# Patient Record
Sex: MALE | Race: WHITE | NOT HISPANIC OR LATINO | RURAL
[De-identification: names, ages, dates, MRNs, and addresses within clinical notes are randomized per-mention and may not be internally consistent; named-entity substitution may affect disease eponyms.]

---

## 2017-08-09 ENCOUNTER — ON CAMPUS - OUTPATIENT (OUTPATIENT)
Dept: RURAL HOSPITAL 3 | Facility: HOSPITAL | Age: 67
End: 2017-08-09
Payer: COMMERCIAL

## 2017-08-09 DIAGNOSIS — R13.10 DYSPHAGIA, UNSPECIFIED: ICD-10-CM

## 2017-08-09 DIAGNOSIS — K62.5 HEMORRHAGE OF ANUS AND RECTUM: ICD-10-CM

## 2017-08-09 PROCEDURE — 45380 COLONOSCOPY AND BIOPSY: CPT | Performed by: INTERNAL MEDICINE

## 2017-08-09 PROCEDURE — 43450 DILATE ESOPHAGUS 1/MULT PASS: CPT | Performed by: INTERNAL MEDICINE

## 2017-08-09 PROCEDURE — 43235 EGD DIAGNOSTIC BRUSH WASH: CPT | Performed by: INTERNAL MEDICINE

## 2018-09-06 ENCOUNTER — HOSPITAL ENCOUNTER (OUTPATIENT)
Dept: CARDIOLOGY | Facility: HOSPITAL | Age: 68
Discharge: HOME | End: 2018-09-06
Attending: PHYSICAL MEDICINE & REHABILITATION | Admitting: PHYSICAL MEDICINE & REHABILITATION

## 2020-05-20 ENCOUNTER — OFFICE (OUTPATIENT)
Dept: RURAL CLINIC 3 | Facility: CLINIC | Age: 70
End: 2020-05-20
Payer: MEDICARE

## 2020-05-20 VITALS
WEIGHT: 195 LBS | SYSTOLIC BLOOD PRESSURE: 139 MMHG | HEIGHT: 69 IN | DIASTOLIC BLOOD PRESSURE: 85 MMHG | HEART RATE: 96 BPM

## 2020-05-20 DIAGNOSIS — D50.0 IRON DEFICIENCY ANEMIA SECONDARY TO BLOOD LOSS (CHRONIC): ICD-10-CM

## 2020-05-20 PROCEDURE — 99203 OFFICE O/P NEW LOW 30 MIN: CPT | Performed by: INTERNAL MEDICINE

## 2020-05-20 RX ORDER — PANTOPRAZOLE SODIUM 40 MG/1
80 TABLET, DELAYED RELEASE ORAL
Qty: 0 | Refills: 0 | Status: COMPLETED
End: 2020-05-20

## 2020-05-20 RX ORDER — ESOMEPRAZOLE MAGNESIUM 40 MG/1
CAPSULE, DELAYED RELEASE ORAL
Qty: 0 | Refills: 0 | Status: ACTIVE

## 2020-08-05 ENCOUNTER — OFFICE (OUTPATIENT)
Dept: RURAL CLINIC 3 | Facility: CLINIC | Age: 70
End: 2020-08-05
Payer: MEDICARE

## 2020-08-05 VITALS
DIASTOLIC BLOOD PRESSURE: 71 MMHG | HEART RATE: 100 BPM | WEIGHT: 199 LBS | SYSTOLIC BLOOD PRESSURE: 113 MMHG | HEIGHT: 69 IN

## 2020-08-05 DIAGNOSIS — D50.0 IRON DEFICIENCY ANEMIA SECONDARY TO BLOOD LOSS (CHRONIC): ICD-10-CM

## 2020-08-05 DIAGNOSIS — K21.9 GASTRO-ESOPHAGEAL REFLUX DISEASE WITHOUT ESOPHAGITIS: ICD-10-CM

## 2020-08-05 PROCEDURE — 99213 OFFICE O/P EST LOW 20 MIN: CPT | Performed by: INTERNAL MEDICINE

## 2023-11-15 ENCOUNTER — OFFICE (OUTPATIENT)
Dept: RURAL CLINIC 3 | Facility: CLINIC | Age: 73
End: 2023-11-15

## 2023-11-15 VITALS
SYSTOLIC BLOOD PRESSURE: 124 MMHG | WEIGHT: 190 LBS | DIASTOLIC BLOOD PRESSURE: 57 MMHG | HEART RATE: 105 BPM | HEIGHT: 69 IN

## 2023-11-15 DIAGNOSIS — K64.4 RESIDUAL HEMORRHOIDAL SKIN TAGS: ICD-10-CM

## 2023-11-15 DIAGNOSIS — K62.5 HEMORRHAGE OF ANUS AND RECTUM: ICD-10-CM

## 2023-11-15 DIAGNOSIS — K59.00 CONSTIPATION, UNSPECIFIED: ICD-10-CM

## 2023-11-15 DIAGNOSIS — D50.0 IRON DEFICIENCY ANEMIA SECONDARY TO BLOOD LOSS (CHRONIC): ICD-10-CM

## 2023-11-15 PROCEDURE — 99204 OFFICE O/P NEW MOD 45 MIN: CPT | Performed by: INTERNAL MEDICINE

## 2023-11-15 RX ORDER — HYDROCORTISONE ACETATE PRAMOXINE HCL 1; 1 G/100G; G/100G
CREAM TOPICAL
Qty: 30 | Refills: 6 | Status: ACTIVE
Start: 2023-11-15

## 2023-11-15 RX ORDER — HYDROCORTISONE ACETATE 25 MG/1
25 SUPPOSITORY RECTAL
Qty: 10 | Refills: 6 | Status: COMPLETED
Start: 2023-11-15 | End: 2024-03-06

## 2023-11-15 RX ORDER — DOCUSATE SODIUM 100 MG/1
CAPSULE, LIQUID FILLED ORAL
Qty: 60 | Status: COMPLETED
End: 2023-11-15

## 2024-03-06 ENCOUNTER — OFFICE (OUTPATIENT)
Dept: RURAL CLINIC 3 | Facility: CLINIC | Age: 74
End: 2024-03-06

## 2024-03-06 VITALS — HEIGHT: 69 IN | DIASTOLIC BLOOD PRESSURE: 62 MMHG | WEIGHT: 189 LBS | SYSTOLIC BLOOD PRESSURE: 108 MMHG

## 2024-03-06 DIAGNOSIS — K62.5 HEMORRHAGE OF ANUS AND RECTUM: ICD-10-CM

## 2024-03-06 DIAGNOSIS — D50.0 IRON DEFICIENCY ANEMIA SECONDARY TO BLOOD LOSS (CHRONIC): ICD-10-CM

## 2024-03-06 PROCEDURE — 99213 OFFICE O/P EST LOW 20 MIN: CPT | Performed by: INTERNAL MEDICINE

## 2024-03-29 ENCOUNTER — HOSPITAL ENCOUNTER (INPATIENT)
Facility: HOSPITAL | Age: 74
LOS: 5 days | Discharge: NURSING, REHAB OR LONG-TERM CARE | End: 2024-04-03
Attending: FAMILY MEDICINE | Admitting: FAMILY MEDICINE
Payer: MEDICARE

## 2024-03-29 PROBLEM — J96.90 RESPIRATORY FAILURE: Status: ACTIVE | Noted: 2024-03-29

## 2024-03-29 LAB
ARTERIAL PATENCY WRIST A: POSITIVE
ATMOSPHERIC PRESS: ABNORMAL MM[HG]
BASE EXCESS BLDA CALC-SCNC: 10.8 MMOL/L (ref 0–3)
BDY SITE: ABNORMAL
CO2 BLDA-SCNC: 40.6 MMOL/L (ref 22–29)
HCO3 BLDA-SCNC: 38.5 MMOL/L (ref 21–28)
HEMODILUTION: NO
INHALED O2 CONCENTRATION: 36 %
MODALITY: ABNORMAL
PCO2 BLDA: 66.4 MM HG (ref 35–48)
PH BLDA: 7.37 PH UNITS (ref 7.35–7.45)
PO2 BLDA: 110.1 MM HG (ref 83–108)
SAO2 % BLDCOA: 97.9 % (ref 94–98)

## 2024-03-29 PROCEDURE — 94761 N-INVAS EAR/PLS OXIMETRY MLT: CPT

## 2024-03-29 PROCEDURE — 25810000003 SODIUM CHLORIDE 0.9 % SOLUTION: Performed by: FAMILY MEDICINE

## 2024-03-29 PROCEDURE — 99222 1ST HOSP IP/OBS MODERATE 55: CPT | Performed by: INTERNAL MEDICINE

## 2024-03-29 PROCEDURE — 94799 UNLISTED PULMONARY SVC/PX: CPT

## 2024-03-29 PROCEDURE — 5A09357 ASSISTANCE WITH RESPIRATORY VENTILATION, LESS THAN 24 CONSECUTIVE HOURS, CONTINUOUS POSITIVE AIRWAY PRESSURE: ICD-10-PCS | Performed by: INTERNAL MEDICINE

## 2024-03-29 PROCEDURE — 93010 ELECTROCARDIOGRAM REPORT: CPT | Performed by: INTERNAL MEDICINE

## 2024-03-29 PROCEDURE — 82803 BLOOD GASES ANY COMBINATION: CPT | Performed by: FAMILY MEDICINE

## 2024-03-29 PROCEDURE — 94660 CPAP INITIATION&MGMT: CPT

## 2024-03-29 PROCEDURE — 93005 ELECTROCARDIOGRAM TRACING: CPT | Performed by: INTERNAL MEDICINE

## 2024-03-29 PROCEDURE — 94640 AIRWAY INHALATION TREATMENT: CPT

## 2024-03-29 PROCEDURE — 25010000002 METHYLPREDNISOLONE PER 125 MG: Performed by: FAMILY MEDICINE

## 2024-03-29 PROCEDURE — 25010000002 CEFTRIAXONE PER 250 MG: Performed by: FAMILY MEDICINE

## 2024-03-29 PROCEDURE — 36600 WITHDRAWAL OF ARTERIAL BLOOD: CPT | Performed by: FAMILY MEDICINE

## 2024-03-29 RX ORDER — SODIUM CHLORIDE 0.9 % (FLUSH) 0.9 %
10 SYRINGE (ML) INJECTION AS NEEDED
Status: DISCONTINUED | OUTPATIENT
Start: 2024-03-29 | End: 2024-04-03 | Stop reason: HOSPADM

## 2024-03-29 RX ORDER — FAMOTIDINE 20 MG/1
20 TABLET, FILM COATED ORAL
Status: DISCONTINUED | OUTPATIENT
Start: 2024-03-29 | End: 2024-03-31

## 2024-03-29 RX ORDER — BUDESONIDE 0.5 MG/2ML
0.5 INHALANT ORAL
Status: DISCONTINUED | OUTPATIENT
Start: 2024-03-29 | End: 2024-03-31

## 2024-03-29 RX ORDER — IPRATROPIUM BROMIDE AND ALBUTEROL SULFATE 2.5; .5 MG/3ML; MG/3ML
3 SOLUTION RESPIRATORY (INHALATION)
Status: DISCONTINUED | OUTPATIENT
Start: 2024-03-29 | End: 2024-03-31

## 2024-03-29 RX ORDER — OMEPRAZOLE 40 MG/1
40 CAPSULE, DELAYED RELEASE ORAL DAILY
COMMUNITY
End: 2024-04-03 | Stop reason: HOSPADM

## 2024-03-29 RX ORDER — TRAZODONE HYDROCHLORIDE 100 MG/1
100 TABLET ORAL 3 TIMES DAILY
COMMUNITY

## 2024-03-29 RX ORDER — SODIUM CHLORIDE 9 MG/ML
40 INJECTION, SOLUTION INTRAVENOUS AS NEEDED
Status: DISCONTINUED | OUTPATIENT
Start: 2024-03-29 | End: 2024-04-03 | Stop reason: HOSPADM

## 2024-03-29 RX ORDER — METHYLPREDNISOLONE SODIUM SUCCINATE 125 MG/2ML
60 INJECTION, POWDER, LYOPHILIZED, FOR SOLUTION INTRAMUSCULAR; INTRAVENOUS EVERY 8 HOURS
Status: DISCONTINUED | OUTPATIENT
Start: 2024-03-29 | End: 2024-03-31

## 2024-03-29 RX ORDER — PREDNISONE 5 MG/1
5 TABLET ORAL DAILY
COMMUNITY
End: 2024-04-03 | Stop reason: HOSPADM

## 2024-03-29 RX ORDER — DILTIAZEM HCL/D5W 125 MG/125
5-15 PLASTIC BAG, INJECTION (ML) INTRAVENOUS
Status: DISCONTINUED | OUTPATIENT
Start: 2024-03-29 | End: 2024-03-30

## 2024-03-29 RX ORDER — FUROSEMIDE 20 MG/1
20 TABLET ORAL DAILY
COMMUNITY
End: 2024-04-03 | Stop reason: HOSPADM

## 2024-03-29 RX ORDER — SODIUM CHLORIDE 9 MG/ML
40 INJECTION, SOLUTION INTRAVENOUS CONTINUOUS
Status: DISCONTINUED | OUTPATIENT
Start: 2024-03-29 | End: 2024-04-02

## 2024-03-29 RX ORDER — ALPRAZOLAM 1 MG/1
1 TABLET ORAL 3 TIMES DAILY PRN
COMMUNITY

## 2024-03-29 RX ORDER — SODIUM CHLORIDE 0.9 % (FLUSH) 0.9 %
10 SYRINGE (ML) INJECTION EVERY 12 HOURS SCHEDULED
Status: DISCONTINUED | OUTPATIENT
Start: 2024-03-29 | End: 2024-04-03 | Stop reason: HOSPADM

## 2024-03-29 RX ADMIN — BUDESONIDE 0.5 MG: 0.5 INHALANT RESPIRATORY (INHALATION) at 19:00

## 2024-03-29 RX ADMIN — IPRATROPIUM BROMIDE AND ALBUTEROL SULFATE 3 ML: .5; 3 SOLUTION RESPIRATORY (INHALATION) at 18:55

## 2024-03-29 RX ADMIN — Medication 10 ML: at 21:49

## 2024-03-29 RX ADMIN — SODIUM CHLORIDE 40 ML/HR: 9 INJECTION, SOLUTION INTRAVENOUS at 17:12

## 2024-03-29 RX ADMIN — FAMOTIDINE 20 MG: 20 TABLET, FILM COATED ORAL at 17:40

## 2024-03-29 RX ADMIN — CEFTRIAXONE 2000 MG: 2 INJECTION, POWDER, FOR SOLUTION INTRAMUSCULAR; INTRAVENOUS at 17:15

## 2024-03-29 RX ADMIN — METHYLPREDNISOLONE SODIUM SUCCINATE 60 MG: 125 INJECTION, POWDER, FOR SOLUTION INTRAMUSCULAR; INTRAVENOUS at 23:34

## 2024-03-29 RX ADMIN — METHYLPREDNISOLONE SODIUM SUCCINATE 60 MG: 125 INJECTION, POWDER, FOR SOLUTION INTRAMUSCULAR; INTRAVENOUS at 17:12

## 2024-03-29 NOTE — H&P
[facility] Medicine Services  History & Physical    Patient Name: Orlando Velasquez  : 1950  MRN: [id number]  Primary Care Physician:  Orlando Payton MD  Date of admission: 3/29/2024  Date and Time of Service: 3/29/2024 at 1553    Subjective      Chief Complaint: shortness of air    History of Present Illness: Orlando Velasquez is a 73 y.o. male with a CMH of hypertension, hyperlipidemia, COPD, chronic respiratory failure presenting from outside facility with difficulty breathing.  He was being treated for pneumonia and COPD exacerbation and acute hypoxic hypercapnic respiratory failure.  He was kept on BiPAP intermittently.  Patient had CT scan done at outside facility which showed possible mucous plugging and he was transferred here for possible pulmonology consult and bronchoscopy.  As of right now patient is on 4 L of supplemental oxygen, at baseline he wears 3 L at home.  Patient also fell down a few days ago and has bruising on his face patient was also diagnosed with rhabdomyolysis and was being treated with IV fluids at outside facility.      Review of Systems   Respiratory:  Positive for cough and shortness of breath.    Cardiovascular:  Negative for chest pain.       Personal History     Past medical history-hypertension, hyperlipidemia, COPD, respiratory failure    No surgeries    Family History: Father had hypertension    Social History:  Previous smoker    Home Medications:  Prior to Admission Medications       None          No current facility-administered medications on file prior to encounter.     No current outpatient medications on file prior to encounter.         Allergies:  No Known Allergies    Objective      Vitals:   Temp:  [98 °F (36.7 °C)] 98 °F (36.7 °C)  Resp:  [22] 22  BP: (146)/(75) 146/75  There is no height or weight on file to calculate BMI.  Physical Exam  Vitals and nursing note reviewed.   Constitutional:       General: He is not in acute distress.      Appearance: He is well-developed. He is not diaphoretic.   HENT:      Head: Normocephalic and atraumatic.   Cardiovascular:      Rate and Rhythm: Normal rate.   Pulmonary:      Effort: Pulmonary effort is normal. Respiratory distress present.      Breath sounds: No wheezing.   Abdominal:      General: There is no distension.      Palpations: Abdomen is soft.   Musculoskeletal:         General: Normal range of motion.   Skin:     General: Skin is warm and dry.   Neurological:      Mental Status: He is alert.      Cranial Nerves: No cranial nerve deficit.   Psychiatric:         Behavior: Behavior normal.         Thought Content: Thought content normal.         Judgment: Judgment normal.         Diagnostic Data:  Lab Results (last 24 hours)       ** No results found for the last 24 hours. **             Imaging Results (Last 24 Hours)       ** No results found for the last 24 hours. **              Assessment & Plan            Active and Resolved Problems  Active Hospital Problems    Diagnosis  POA    **Respiratory failure [J96.90]  Yes      Resolved Hospital Problems   No resolved problems to display.         Acute on chronic hypoxic hypercapnic respiratory failure-ABG will be ordered, will start on BiPAP as needed, pulmonology consult    Pneumonia-IV antibiotics have been started, cultures will be ordered, continue to monitor    Atrial fibrillation-cardiology consult, we will continue to monitor heart rate, Cardizem will be given as needed    Rhabdomyolysis-continue with IV fluids continue monitoring    Hypertension-continue monitoring blood pressure    DVT prophylaxis-SCD        CODE STATUS:    Code Status (Patient has no pulse and is not breathing): CPR (Attempt to Resuscitate)  Medical Interventions (Patient has pulse or is breathing): Full Support      InpatientAdmission Status:  I believe this patient meets inpatient status.    Expected Length of Stay: 2-3    PDMP and Medication Dispenses via Sidebar reviewed  and consistent with patient reported medications.    I discussed the patient's findings and my recommendations with patient and family.      Signature:     This document has been electronically signed by Tommie Palm MD on March 29, 2024 15:53 EDT   [facility]

## 2024-03-29 NOTE — CASE MANAGEMENT/SOCIAL WORK
Discharge Planning Assessment   Zaid     Patient Name: Orlando Velasquez  MRN: [id number]  Today's Date: 3/29/2024    Admit Date: 3/29/2024    Plan: [facility] referral pending PT recommendations. No precert required. PASRR required. From home with spouse. Current home O2 (3L) through [place].   Discharge Needs Assessment       Row Name 03/29/24 1611       Living Environment    People in Home spouse    Current Living Arrangements home    Potentially Unsafe Housing Conditions none    In the past 12 months has the electric, gas, oil, or water company threatened to shut off services in your home? No    Primary Care Provided by self    Provides Primary Care For no one    Family Caregiver if Needed child(migdalia), adult;grandchild(migdalia), adult;spouse    Quality of Family Relationships helpful    Able to Return to Prior Arrangements yes       Resource/Environmental Concerns    Resource/Environmental Concerns none    Transportation Concerns none       Transportation Needs    In the past 12 months, has lack of transportation kept you from medical appointments or from getting medications? no    In the past 12 months, has lack of transportation kept you from meetings, work, or from getting things needed for daily living? No       Food Insecurity    Within the past 12 months, you worried that your food would run out before you got the money to buy more. Never true    Within the past 12 months, the food you bought just didn't last and you didn't have money to get more. Never true       Transition Planning    Patient/Family Anticipates Transition to home with help/services;other (see comments)  SNF    Patient/Family Anticipated Services at Transition none    Transportation Anticipated family or friend will provide       Discharge Needs Assessment    Readmission Within the Last 30 Days no previous admission in last 30 days    Equipment Currently Used at Home oxygen    Concerns to be Addressed denies needs/concerns at this time     Anticipated Changes Related to Illness none    Equipment Needed After Discharge none                   Discharge Plan       Row Name 03/29/24 1612       Plan    Plan [facility] referral pending PT recommendations. No precert required. PASRR required. From home with spouse. Current home O2 (3L) through [place].    Patient/Family in Agreement with Plan yes    Plan Comments CM met with patient and spouse at bedside. Patient lives with spouse who assists with ADLs and transportation. PCP and pharmacy verified-reports high med costs but able to afford all current meds. [facility] following for [facility] per spouse, CM notified liaison. [facility] letter reviewed with spouse, consent obtained and declined copy. Patient denies any further d/c needs at this time. Barrier to D/C: 4L O2, cardiology and pulmonology consults, bronch pending.                      Expected Discharge Date and Time       Expected Discharge Date Expected Discharge Time    Apr 1, 2024            Demographic Summary       Row Name 03/29/24 1611       General Information    Admission Type inpatient    Arrived From emergency department    Referral Source admission list    Reason for Consult discharge planning    Preferred Language English       Contact Information    Permission Granted to Share Info With                    Functional Status       Row Name 03/29/24 1611       Functional Status    Usual Activity Tolerance moderate    Current Activity Tolerance moderate       Functional Status, IADL    Medications assistive person    Meal Preparation assistive person    Housekeeping assistive person    Laundry assistive person    Shopping assistive person       Mental Status    General Appearance WDL WDL       Mental Status Summary    Recent Changes in Mental Status/Cognitive Functioning no changes                       Patient Forms       Row Name 03/29/24 1615       Patient Forms    Important Message from Medicare ([facility]) Delivered  3/29/24     Delivered to Support person  spouse    Method of delivery In person                  Met with patient in room.  Maintained distance greater than six feet and spent less than 15 minutes in the room.     ALTON BlackmanN, RN    [address] [address]    Office: [phone]  Fax: [phone]

## 2024-03-29 NOTE — PLAN OF CARE
Goal Outcome Evaluation:        Problem: Skin Injury Risk Increased  Goal: Skin Health and Integrity  Outcome: Ongoing, Progressing  Intervention: Optimize Skin Protection  Recent Flowsheet Documentation  Taken 3/29/2024 1627 by Cookie Cr RN  Pressure Reduction Techniques: frequent weight shift encouraged  Head of Bed (HOB) Positioning: HOB at 30-45 degrees  Pressure Reduction Devices: positioning supports utilized  Skin Protection: adhesive use limited  Taken 3/29/2024 1411 by Cookie Cr RN  Head of Bed (HOB) Positioning: HOB at 30-45 degrees     Problem: Adult Inpatient Plan of Care  Goal: Plan of Care Review  Outcome: Ongoing, Progressing  Goal: Patient-Specific Goal (Individualized)  Outcome: Ongoing, Progressing  Goal: Absence of Hospital-Acquired Illness or Injury  Outcome: Ongoing, Progressing  Intervention: Identify and Manage Fall Risk  Recent Flowsheet Documentation  Taken 3/29/2024 1627 by Cookie Cr RN  Safety Promotion/Fall Prevention:   activity supervised   assistive device/personal items within reach   clutter free environment maintained   fall prevention program maintained   nonskid shoes/slippers when out of bed   safety round/check completed   room organization consistent  Taken 3/29/2024 1411 by Cookie Cr RN  Safety Promotion/Fall Prevention:   activity supervised   assistive device/personal items within reach   clutter free environment maintained   fall prevention program maintained   muscle strengthening facilitated   nonskid shoes/slippers when out of bed   room organization consistent   safety round/check completed  Intervention: Prevent Skin Injury  Recent Flowsheet Documentation  Taken 3/29/2024 1627 by Cookie Cr RN  Body Position: position changed independently  Skin Protection: adhesive use limited  Taken 3/29/2024 1411 by Cookie Cr RN  Body Position: position changed independently  Intervention: Prevent and Manage VTE (Venous Thromboembolism)  Risk  Recent Flowsheet Documentation  Taken 3/29/2024 1627 by Cookie Cr RN  Activity Management: activity encouraged  VTE Prevention/Management:   patient refused intervention   bilateral   sequential compression devices off  Range of Motion: active ROM (range of motion) encouraged  Taken 3/29/2024 1411 by Cookie Cr RN  Activity Management: activity encouraged  VTE Prevention/Management:   patient refused intervention   bilateral   sequential compression devices off  Range of Motion: active ROM (range of motion) encouraged  Intervention: Prevent Infection  Recent Flowsheet Documentation  Taken 3/29/2024 1627 by Cookie Cr RN  Infection Prevention:   hand hygiene promoted   personal protective equipment utilized   rest/sleep promoted   single patient room provided   environmental surveillance performed  Taken 3/29/2024 1411 by Cookie Cr RN  Infection Prevention:   hand hygiene promoted   personal protective equipment utilized   rest/sleep promoted   single patient room provided   environmental surveillance performed  Goal: Optimal Comfort and Wellbeing  Outcome: Ongoing, Progressing  Intervention: Provide Person-Centered Care  Recent Flowsheet Documentation  Taken 3/29/2024 1627 by Cookie Cr RN  Trust Relationship/Rapport:   care explained   choices provided  Taken 3/29/2024 1411 by Cookie Cr RN  Trust Relationship/Rapport:   care explained   choices provided  Goal: Readiness for Transition of Care  Outcome: Ongoing, Progressing

## 2024-03-29 NOTE — CONSULTS
Referring Provider: Tommie Palm MD    Reason for Consultation: Atrial fibrillation      Patient Care Team:  Jagdeep Dailey DO as PCP - General (Internal Medicine)      SUBJECTIVE     Chief Complaint: Shortness of breath    History of present illness:  Orlando Velasquez is a 73 y.o. male with hypertension, hyperlipidemia,, pulmonary embolism in 2018, severe COPD who presented to the hospital due to shortness of breath and difficulty breathing.  Apparently he was being treated for pneumonia and COPD exacerbation and was on BiPAP intermittently.  A CT scan showed possible mucous plugging for which she was transferred to [facility] for possible bronchoscopy.  He is currently on 4 L of supplemental oxygen.  Cardiology has been consulted for management of atrial fibrillation.    Review of systems:    Constitutional: No weakness, fatigue, fever, rigors, chills   Eyes: No vision changes, eye pain   ENT/oropharynx: No difficulty swallowing, sore throat, epistaxis, changes in hearing   Cardiovascular: No chest pain, chest tightness, palpitations, paroxysmal nocturnal dyspnea, orthopnea, diaphoresis, dizziness / syncopal episode   Respiratory: + shortness of breath, dyspnea on exertion, cough, wheezing, hemoptysis   Gastrointestinal: No abdominal pain, nausea, vomiting, diarrhea, bloody stools   Genitourinary: No hematuria, dysuria   Neurological: No headache, tremors, numbness, one-sided weakness    Musculoskeletal: No cramps, myalgias, joint pain, joint swelling   Integument: No rash, edema        Personal History:      Past Medical History:   Diagnosis Date    A-fib     Arthritis     Asthma     Cancer     COPD (chronic obstructive pulmonary disease)     DVT (deep venous thrombosis)     GERD (gastroesophageal reflux disease)     Hernia, abdominal     History of transfusion     PE (pulmonary thromboembolism)     Presence of IVC filter        History reviewed. No pertinent surgical history.    History reviewed.  "No pertinent family history.    Social History     Tobacco Use    Smoking status: Former     Current packs/day: 0.00     Types: Cigarettes     Quit date:      Years since quittin.2     Passive exposure: Past    Smokeless tobacco: Never   Vaping Use    Vaping status: Never Used   Substance Use Topics    Alcohol use: Never    Drug use: Never        Home meds:  Prior to Admission medications    Not on File       Allergies:     Patient has no known allergies.    Scheduled Meds:budesonide, 0.5 mg, Nebulization, BID - RT  cefTRIAXone, 1,000 mg, Intravenous, Q24H  famotidine, 20 mg, Oral, BID AC  ipratropium-albuterol, 3 mL, Nebulization, 4x Daily - RT  methylPREDNISolone sodium succinate, 60 mg, Intravenous, Q8H  sodium chloride, 10 mL, Intravenous, Q12H      Continuous Infusions:dilTIAZem, 5-15 mg/hr  sodium chloride, 40 mL/hr      PRN Meds:  sodium chloride    sodium chloride      OBJECTIVE    Vital Signs  Vitals:    24 1411 24 1500 24 1640 24 1704   BP: 146/75   144/77   BP Location: Left arm   Left arm   Patient Position: Lying   Lying   Pulse:   78 84   Resp: 22   25   Temp: 98 °F (36.7 °C)   98 °F (36.7 °C)   TempSrc: Oral   Oral   SpO2:   91% 92%   Weight:  92.3 kg (203 lb 7.8 oz)     Height:   162.6 cm (64\")            No intake or output data in the 24 hours ending 24 1706     Telemetry: Atrial fibrillation converted to normal sinus rhythm.    Physical Exam:  The patient is alert, oriented and in no distress.  Frail-appearing  Vital signs as noted above.  Head and neck revealed no carotid bruits or jugular venous distention.  No thyromegaly or lymphadenopathy is present  Distant and diminished breath sounds.  Heart: Normal first and second heart sounds. No murmur.  No precordial rub is present.  No gallop is present.  Abdomen: Soft and nontender.  No organomegaly is present.  Extremities with good peripheral pulses without any pedal edema.  Skin: Warm and " dry.  Musculoskeletal system is grossly normal.  CNS grossly normal.       Results Review:  I have personally reviewed the results from the time of this admission to 3/29/2024 17:06 EDT and agree with these findings:  []  Laboratory  []  Microbiology  []  Radiology  []  EKG/Telemetry   []  Cardiology/Vascular   []  Pathology  []  Old records  []  Other:    Most notable findings include:     Lab Results (last 24 hours)       Procedure Component Value Units Date/Time    Blood Gas, Arterial - [[id number]]  (Abnormal) Collected: 03/29/24 1606    Specimen: Arterial Blood Updated: 03/29/24 1612     Site Right Radial     Oscar's Test Positive     pH, Arterial 7.371 pH units      pCO2, Arterial 66.4 mm Hg      pO2, Arterial 110.1 mm Hg      HCO3, Arterial 38.5 mmol/L      Base Excess, Arterial 10.8 mmol/L      Comment: Serial Number: [id number]:  [id number]        O2 Saturation, Arterial 97.9 %      CO2 Content 40.6 mmol/L      Barometric Pressure for Blood Gas --     Comment: N/A        Modality Cannula     FIO2 36 %      Hemodilution No            Imaging Results (Last 24 Hours)       ** No results found for the last 24 hours. **            LAB RESULTS (LAST 7 DAYS)    CBC        BMP        CMP         BNP        TROPONIN        CoAg        Creatinine Clearance  CrCl cannot be calculated (Patient's most recent lab result is older than the maximum 30 days allowed.).    ABG  Results from last 7 days   Lab Units 03/29/24  1606   PH, ARTERIAL pH units 7.371   PCO2, ARTERIAL mm Hg 66.4*   PO2 ART mm Hg 110.1*   O2 SATURATION ART % 97.9   BASE EXCESS ART mmol/L 10.8*         Radiology  No radiology results for the last day      EKG  I personally viewed and interpreted the patient's EKG/Telemetry data:  ECG 12 Lead Rhythm Change    (Results Pending)         Echocardiogram:          Stress Test:        Cardiac Catheterization:  No results found for this or any previous visit.        Other:      ASSESSMENT & PLAN:    Principal  Problem:    Respiratory failure    Atrial fibrillation  DAB2RT4-TYPh score is 4  Hold anticoagulation for possible bronchoscopy  Unable to use Cardizem as blood pressure is low.  Can use IV amiodarone if needed for RVR  He has converted to sinus rhythm in the 70s  Plan to resume anticoagulation after all procedures have been performed.  Avoiding beta-blocker due to severe COPD.    Rhabdomyolysis  Currently on IV fluids  Pending labs    Hyperlipidemia  Start high intensity statin  Obtain a lipid panel    COPD/pneumonia  Acute hypoxemic hypercapnic respiratory failure.  Currently on antibiotics, bronchodilators and steroids  Supplemental oxygen as needed    History of pulmonary embolism in 2018  History of IVC filter which was removed in 2019    Obesity  BMI of 35  Obesity complicates all aspects of his care  Lifestyle modifications recommended to the patient    Clinton Fisher MD  03/29/24  17:06 EDT

## 2024-03-29 NOTE — CASE MANAGEMENT/SOCIAL WORK
Social Work Assessment   Zaid     Patient Name: Orlando Velasquez  MRN: [id number]  Today's Date: 3/29/2024    Admit Date: 3/29/2024     Discharge Plan       Row Name 03/29/24 1620       Plan    Plan [facility] referral pending PT recommendations. No precert required. PASRR approved. From home with spouse. Current home O2 (3L) through [place].        CY Chun, LSW, [facility]    Phone: [phone]  Cell: [phone]  Fax: [phone]  [email or web address]

## 2024-03-30 LAB
ANION GAP SERPL CALCULATED.3IONS-SCNC: 6 MMOL/L (ref 5–15)
ANION GAP SERPL CALCULATED.3IONS-SCNC: 9 MMOL/L (ref 5–15)
ANISOCYTOSIS BLD QL: ABNORMAL
BUN SERPL-MCNC: 19 MG/DL (ref 8–23)
BUN SERPL-MCNC: 23 MG/DL (ref 8–23)
BUN/CREAT SERPL: 25.3 (ref 7–25)
BUN/CREAT SERPL: 27.1 (ref 7–25)
CALCIUM SPEC-SCNC: 8.2 MG/DL (ref 8.6–10.5)
CALCIUM SPEC-SCNC: 8.9 MG/DL (ref 8.6–10.5)
CHLORIDE SERPL-SCNC: 102 MMOL/L (ref 98–107)
CHLORIDE SERPL-SCNC: 103 MMOL/L (ref 98–107)
CHOLEST SERPL-MCNC: 135 MG/DL (ref 0–200)
CO2 SERPL-SCNC: 32 MMOL/L (ref 22–29)
CO2 SERPL-SCNC: 33 MMOL/L (ref 22–29)
CREAT SERPL-MCNC: 0.75 MG/DL (ref 0.76–1.27)
CREAT SERPL-MCNC: 0.85 MG/DL (ref 0.76–1.27)
D-LACTATE SERPL-SCNC: 1.5 MMOL/L (ref 0.5–2)
DEPRECATED RDW RBC AUTO: 49.8 FL (ref 37–54)
EGFRCR SERPLBLD CKD-EPI 2021: 92.3 ML/MIN/1.73
EGFRCR SERPLBLD CKD-EPI 2021: 95.9 ML/MIN/1.73
EOSINOPHIL # BLD MANUAL: 0.09 10*3/MM3 (ref 0–0.4)
EOSINOPHIL NFR BLD MANUAL: 1 % (ref 0.3–6.2)
ERYTHROCYTE [DISTWIDTH] IN BLOOD BY AUTOMATED COUNT: 14.8 % (ref 12.3–15.4)
GLUCOSE BLDC GLUCOMTR-MCNC: 112 MG/DL (ref 70–105)
GLUCOSE SERPL-MCNC: 116 MG/DL (ref 65–99)
GLUCOSE SERPL-MCNC: 137 MG/DL (ref 65–99)
HCT VFR BLD AUTO: 36 % (ref 37.5–51)
HDLC SERPL-MCNC: 34 MG/DL (ref 40–60)
HGB BLD-MCNC: 10.5 G/DL (ref 13–17.7)
LDLC SERPL CALC-MCNC: 80 MG/DL (ref 0–100)
LDLC/HDLC SERPL: 2.31 {RATIO}
LYMPHOCYTES # BLD MANUAL: 0.6 10*3/MM3 (ref 0.7–3.1)
MAGNESIUM SERPL-MCNC: 2 MG/DL (ref 1.6–2.4)
MAGNESIUM SERPL-MCNC: 2.1 MG/DL (ref 1.6–2.4)
MCH RBC QN AUTO: 26.5 PG (ref 26.6–33)
MCHC RBC AUTO-ENTMCNC: 29.2 G/DL (ref 31.5–35.7)
MCV RBC AUTO: 90.9 FL (ref 79–97)
NEUTROPHILS # BLD AUTO: 7.83 10*3/MM3 (ref 1.7–7)
NEUTROPHILS NFR BLD MANUAL: 92 % (ref 42.7–76)
NRBC SPEC MANUAL: 1 /100 WBC (ref 0–0.2)
PLATELET # BLD AUTO: 241 10*3/MM3 (ref 140–450)
PMV BLD AUTO: 9.5 FL (ref 6–12)
POTASSIUM SERPL-SCNC: 4.2 MMOL/L (ref 3.5–5.2)
POTASSIUM SERPL-SCNC: 4.5 MMOL/L (ref 3.5–5.2)
QT INTERVAL: 353 MS
QTC INTERVAL: 386 MS
RBC # BLD AUTO: 3.96 10*6/MM3 (ref 4.14–5.8)
SCAN SLIDE: NORMAL
SMALL PLATELETS BLD QL SMEAR: ADEQUATE
SODIUM SERPL-SCNC: 141 MMOL/L (ref 136–145)
SODIUM SERPL-SCNC: 144 MMOL/L (ref 136–145)
TRIGL SERPL-MCNC: 113 MG/DL (ref 0–150)
VARIANT LYMPHS NFR BLD MANUAL: 7 % (ref 19.6–45.3)
VLDLC SERPL-MCNC: 21 MG/DL (ref 5–40)
WBC MORPH BLD: NORMAL
WBC NRBC COR # BLD AUTO: 8.51 10*3/MM3 (ref 3.4–10.8)

## 2024-03-30 PROCEDURE — 97162 PT EVAL MOD COMPLEX 30 MIN: CPT

## 2024-03-30 PROCEDURE — 25010000002 HALOPERIDOL LACTATE PER 5 MG: Performed by: STUDENT IN AN ORGANIZED HEALTH CARE EDUCATION/TRAINING PROGRAM

## 2024-03-30 PROCEDURE — 94799 UNLISTED PULMONARY SVC/PX: CPT

## 2024-03-30 PROCEDURE — 25010000002 METHYLPREDNISOLONE PER 125 MG: Performed by: FAMILY MEDICINE

## 2024-03-30 PROCEDURE — 92610 EVALUATE SWALLOWING FUNCTION: CPT

## 2024-03-30 PROCEDURE — 94664 DEMO&/EVAL PT USE INHALER: CPT

## 2024-03-30 PROCEDURE — 25010000002 CEFTRIAXONE PER 250 MG: Performed by: FAMILY MEDICINE

## 2024-03-30 PROCEDURE — 83605 ASSAY OF LACTIC ACID: CPT | Performed by: FAMILY MEDICINE

## 2024-03-30 PROCEDURE — 82948 REAGENT STRIP/BLOOD GLUCOSE: CPT

## 2024-03-30 PROCEDURE — 87040 BLOOD CULTURE FOR BACTERIA: CPT | Performed by: INTERNAL MEDICINE

## 2024-03-30 PROCEDURE — 85025 COMPLETE CBC W/AUTO DIFF WBC: CPT | Performed by: FAMILY MEDICINE

## 2024-03-30 PROCEDURE — 80061 LIPID PANEL: CPT | Performed by: INTERNAL MEDICINE

## 2024-03-30 PROCEDURE — 83735 ASSAY OF MAGNESIUM: CPT | Performed by: FAMILY MEDICINE

## 2024-03-30 PROCEDURE — 94761 N-INVAS EAR/PLS OXIMETRY MLT: CPT

## 2024-03-30 PROCEDURE — 99232 SBSQ HOSP IP/OBS MODERATE 35: CPT | Performed by: INTERNAL MEDICINE

## 2024-03-30 PROCEDURE — 85007 BL SMEAR W/DIFF WBC COUNT: CPT | Performed by: FAMILY MEDICINE

## 2024-03-30 PROCEDURE — 80048 BASIC METABOLIC PNL TOTAL CA: CPT | Performed by: FAMILY MEDICINE

## 2024-03-30 PROCEDURE — 94660 CPAP INITIATION&MGMT: CPT

## 2024-03-30 RX ORDER — HALOPERIDOL 5 MG/ML
2 INJECTION INTRAMUSCULAR ONCE
Status: COMPLETED | OUTPATIENT
Start: 2024-03-30 | End: 2024-03-30

## 2024-03-30 RX ORDER — ALPRAZOLAM 1 MG/1
1 TABLET ORAL 3 TIMES DAILY PRN
Status: DISCONTINUED | OUTPATIENT
Start: 2024-03-30 | End: 2024-04-03 | Stop reason: HOSPADM

## 2024-03-30 RX ORDER — IPRATROPIUM BROMIDE AND ALBUTEROL SULFATE 2.5; .5 MG/3ML; MG/3ML
3 SOLUTION RESPIRATORY (INHALATION) EVERY 4 HOURS PRN
COMMUNITY

## 2024-03-30 RX ORDER — ALBUTEROL SULFATE 90 UG/1
2 AEROSOL, METERED RESPIRATORY (INHALATION) EVERY 4 HOURS PRN
COMMUNITY

## 2024-03-30 RX ORDER — ACETYLCYSTEINE 200 MG/ML
3 SOLUTION ORAL; RESPIRATORY (INHALATION)
Status: DISCONTINUED | OUTPATIENT
Start: 2024-03-30 | End: 2024-03-31

## 2024-03-30 RX ORDER — IPRATROPIUM BROMIDE AND ALBUTEROL SULFATE 2.5; .5 MG/3ML; MG/3ML
3 SOLUTION RESPIRATORY (INHALATION) EVERY 4 HOURS PRN
Status: DISCONTINUED | OUTPATIENT
Start: 2024-03-30 | End: 2024-03-31

## 2024-03-30 RX ORDER — BUDESONIDE AND FORMOTEROL FUMARATE DIHYDRATE 80; 4.5 UG/1; UG/1
2 AEROSOL RESPIRATORY (INHALATION)
COMMUNITY
End: 2024-04-03 | Stop reason: HOSPADM

## 2024-03-30 RX ADMIN — Medication 10 ML: at 08:18

## 2024-03-30 RX ADMIN — HALOPERIDOL LACTATE 2 MG: 5 INJECTION, SOLUTION INTRAMUSCULAR at 19:39

## 2024-03-30 RX ADMIN — METHYLPREDNISOLONE SODIUM SUCCINATE 60 MG: 125 INJECTION, POWDER, FOR SOLUTION INTRAMUSCULAR; INTRAVENOUS at 16:55

## 2024-03-30 RX ADMIN — METHYLPREDNISOLONE SODIUM SUCCINATE 60 MG: 125 INJECTION, POWDER, FOR SOLUTION INTRAMUSCULAR; INTRAVENOUS at 08:16

## 2024-03-30 RX ADMIN — ALPRAZOLAM 1 MG: 1 TABLET ORAL at 20:27

## 2024-03-30 RX ADMIN — IPRATROPIUM BROMIDE AND ALBUTEROL SULFATE 3 ML: .5; 3 SOLUTION RESPIRATORY (INHALATION) at 10:48

## 2024-03-30 RX ADMIN — IPRATROPIUM BROMIDE AND ALBUTEROL SULFATE 3 ML: .5; 3 SOLUTION RESPIRATORY (INHALATION) at 08:00

## 2024-03-30 RX ADMIN — Medication 10 ML: at 20:43

## 2024-03-30 RX ADMIN — ACETYLCYSTEINE 3 ML: 200 SOLUTION ORAL; RESPIRATORY (INHALATION) at 18:53

## 2024-03-30 RX ADMIN — IPRATROPIUM BROMIDE AND ALBUTEROL SULFATE 3 ML: .5; 3 SOLUTION RESPIRATORY (INHALATION) at 22:23

## 2024-03-30 RX ADMIN — BUDESONIDE 0.5 MG: 0.5 INHALANT RESPIRATORY (INHALATION) at 08:00

## 2024-03-30 RX ADMIN — BUDESONIDE 0.5 MG: 0.5 INHALANT RESPIRATORY (INHALATION) at 19:03

## 2024-03-30 RX ADMIN — IPRATROPIUM BROMIDE AND ALBUTEROL SULFATE 3 ML: .5; 3 SOLUTION RESPIRATORY (INHALATION) at 18:53

## 2024-03-30 RX ADMIN — IPRATROPIUM BROMIDE AND ALBUTEROL SULFATE 3 ML: .5; 3 SOLUTION RESPIRATORY (INHALATION) at 03:52

## 2024-03-30 RX ADMIN — IPRATROPIUM BROMIDE AND ALBUTEROL SULFATE 3 ML: .5; 3 SOLUTION RESPIRATORY (INHALATION) at 16:03

## 2024-03-30 RX ADMIN — FAMOTIDINE 20 MG: 20 TABLET, FILM COATED ORAL at 16:55

## 2024-03-30 RX ADMIN — CEFTRIAXONE 2000 MG: 2 INJECTION, POWDER, FOR SOLUTION INTRAMUSCULAR; INTRAVENOUS at 16:55

## 2024-03-30 NOTE — PLAN OF CARE
"Goal Outcome Evaluation:   The patient was seen today for a clinical swallow evaluation. Attempted to see patient earlier however he was NPO for a possible Bronchoscopy. Per nurse report, this was canceled and po diet resumed (current diet is regular consistency, thin liquids, cardiac, healthy heart). No difficulties with swallowing reported by the nurse. Upon entrance to his room he was seated up at a 90 degree angle in a chair. Wife/family member present. An oral motor assessment was completed (see above). The patient was able to self feed all trials/consistencies and was assessed with the following: thin liquid by straw x several sips, puree (applesauce) x several trials, and regular solid (peanut butter/cracker). He did not want to attempt mechanical soft/soft to chew as he did not like food items offered. Labial seal on straw was slightly reduced, however functional. He states he has to use a straw to drink lately as he will have bilateraly anterior labial spillage when drinking from a cup. Both the patient and wife state this has been occuring for the past several months. He was able adequately pull thin liquids via straw well. No anterior spillage noted with thins or any consistency during this assessment. Functional oral transit/bolus control noted. He clears the oral cavity well with no residue evident. No overt pharyngeal findings noted during this session. He presents with a clear vocal quality, no coughing, throat clearing or \"wet\" vocal quality noted. It should be noted that throughout the session he presented with tremulous upper extremities, bilaterally, with increased shaking noted with intentional movement. Wife states she feels this is related to the steroids however at times is worse and she has to feed him.    RECOMMENDATIONS:  It is recommended that the patient continue his current diet of regular consistency, thin liquids (cardiac/healthy heart) at this time. He should continue to be up at a " full 90 degree angle for all meals/medications. Assist with feeding if indicated due to tremors/shaking of his upper extremities noted today. Monitor closely for any overt s/s of difficulty and if noted, stop feeding and notify this department and/or MD. ST will follow up with a full meal assessment and additional goals/recommendations to follow. Discussed at length with patient and his wife. All in agreement with plan and recommendations. He may benefit from an OT evaluation due to difficulties with self feeding evident during this session as evidenced by the tremulous upper extremities.                              SLP Swallowing Diagnosis: mild, oral dysphagia, R/O pharyngeal dysphagia (03/30/24 1200)

## 2024-03-30 NOTE — CONSULTS
PULMONARY CRITICAL CARE CONSULT NOTE      PATIENT IDENTIFICATION:  Name: Orlando Velasquez  MRN: [id number]  :  1950     Age: 73 y.o.  Sex: male        DATE OF CONSULTATION:  3/30/2024  PRIMARY CARE PHYSICIAN    Jagdeep Dailey DO                  CHIEF COMPLAINT: SOB    History of Present Illness:   Orlando Velasquez is a 73 y.o. male with a history of COPD, A-fib, Hx PE/DVT, GERD, Anemia, Anxiety, Panic attacks, Idiopathic scoliosis, and RLS who came to [facility] from [facility] with complaints of SOB. CT at [facility] showed possible mucus plugs and patient admitted for further treatment.       Review of Systems:   Constitutional: As above    Eyes: negative   ENT/oropharynx: negative   Cardiovascular: As above    Respiratory: As above    Gastrointestinal: negative   Genitourinary: negative   Neurological: negative   Musculoskeletal: negative   Integument/breast: negative   Endocrine: negative   Allergic/Immunologic: negative     Past Medical History:  Past Medical History:   Diagnosis Date    A-fib     Arthritis     Asthma     Cancer     COPD (chronic obstructive pulmonary disease)     DVT (deep venous thrombosis)     GERD (gastroesophageal reflux disease)     Hernia, abdominal     History of transfusion     PE (pulmonary thromboembolism)     Presence of IVC filter        Past Surgical History:  History reviewed. No pertinent surgical history.     Family History:  History reviewed. No pertinent family history.     Social History:   Social History     Tobacco Use    Smoking status: Former     Current packs/day: 0.00     Types: Cigarettes     Quit date:      Years since quittin.2     Passive exposure: Past    Smokeless tobacco: Never   Substance Use Topics    Alcohol use: Never        Allergies:  No Known Allergies    Home Meds:  Medications Prior to Admission   Medication Sig Dispense Refill Last Dose    ALPRAZolam (XANAX) 1 MG tablet Take 1 tablet by mouth 3 (Three) Times a Day As Needed for Anxiety.        "apixaban (ELIQUIS) 5 MG tablet tablet Take 1 tablet by mouth 2 (Two) Times a Day.       furosemide (LASIX) 20 MG tablet Take 1 tablet by mouth Daily.       omeprazole (priLOSEC) 40 MG capsule Take 1 capsule by mouth Daily.       predniSONE (DELTASONE) 5 MG tablet Take 1 tablet by mouth Daily.       traZODone (DESYREL) 100 MG tablet Take 1 tablet by mouth 3 (Three) Times a Day.       albuterol sulfate  (90 Base) MCG/ACT inhaler Inhale 2 puffs Every 4 (Four) Hours As Needed for Wheezing.       budesonide-formoterol (SYMBICORT) 80-4.5 MCG/ACT inhaler Inhale 2 puffs 2 (Two) Times a Day.       ipratropium-albuterol (DUO-NEB) 0.5-2.5 mg/3 ml nebulizer Take 3 mL by nebulization Every 4 (Four) Hours As Needed for Wheezing.          Objective:  tMax 24 hrs: Temp (24hrs), Av °F (36.7 °C), Min:97.8 °F (36.6 °C), Max:98.1 °F (36.7 °C)      Vitals Ranges:   Temp:  [97.8 °F (36.6 °C)-98.1 °F (36.7 °C)] 97.8 °F (36.6 °C)  Heart Rate:  [54-97] 91  Resp:  [12-25] 18  BP: ()/(56-77) 127/66    Intake and Output Last 3 Shifts:   I/O last 3 completed shifts:  In: -   Out: 350 [Urine:350]    Exam:  /66 (BP Location: Left arm, Patient Position: Lying)   Pulse 91   Temp 97.8 °F (36.6 °C) (Oral)   Resp 18   Ht 162.6 cm (64\")   Wt 90 kg (198 lb 6.6 oz)   SpO2 93%   BMI 34.06 kg/m²       24  1500 24  0500   Weight: 92.3 kg (203 lb 7.8 oz) 90 kg (198 lb 6.6 oz)     General Appearance:      HEENT:  Normocephalic, without obvious abnormality, atraumatic. Conjunctivae/corneas clear.  Normal external ear canals. Nares normal, no drainage.  Neck:  Supple, symmetrical, trachea midline. No JVD.  Lungs /Chest wall:   Bilateral basal rhonchi, respirations unlabored, symmetrical wall movement.     Heart:  Regular rate and rhythm, systolic murmur PMI left sternal border  Abdomen: Soft, nontender, no masses, no organomegaly.    Extremities: Trace edema, no clubbing or cyanosis        Data Review:  All labs " (24hrs):   Recent Results (from the past 24 hour(s))   Blood Gas, Arterial -    Collection Time: 03/29/24  4:06 PM    Specimen: Arterial Blood   Result Value Ref Range    Site Right Radial     Oscar's Test Positive     pH, Arterial 7.371 7.350 - 7.450 pH units    pCO2, Arterial 66.4 (H) 35.0 - 48.0 mm Hg    pO2, Arterial 110.1 (H) 83.0 - 108.0 mm Hg    HCO3, Arterial 38.5 (H) 21.0 - 28.0 mmol/L    Base Excess, Arterial 10.8 (H) 0.0 - 3.0 mmol/L    O2 Saturation, Arterial 97.9 94.0 - 98.0 %    CO2 Content 40.6 (H) 22 - 29 mmol/L    Barometric Pressure for Blood Gas      Modality Cannula     FIO2 36 %    Hemodilution No    ECG 12 Lead Rhythm Change    Collection Time: 03/29/24  5:35 PM   Result Value Ref Range    QT Interval 353 ms    QTC Interval 386 ms   Basic Metabolic Panel    Collection Time: 03/30/24  4:50 AM    Specimen: Blood   Result Value Ref Range    Glucose 116 (H) 65 - 99 mg/dL    BUN 19 8 - 23 mg/dL    Creatinine 0.75 (L) 0.76 - 1.27 mg/dL    Sodium 141 136 - 145 mmol/L    Potassium 4.5 3.5 - 5.2 mmol/L    Chloride 103 98 - 107 mmol/L    CO2 32.0 (H) 22.0 - 29.0 mmol/L    Calcium 8.2 (L) 8.6 - 10.5 mg/dL    BUN/Creatinine Ratio 25.3 (H) 7.0 - 25.0    Anion Gap 6.0 5.0 - 15.0 mmol/L    eGFR 95.9 >60.0 mL/min/1.73   Lactic Acid, Plasma    Collection Time: 03/30/24  4:50 AM    Specimen: Blood   Result Value Ref Range    Lactate 1.5 0.5 - 2.0 mmol/L   Magnesium    Collection Time: 03/30/24  4:50 AM    Specimen: Blood   Result Value Ref Range    Magnesium 2.0 1.6 - 2.4 mg/dL   Lipid Panel    Collection Time: 03/30/24  4:50 AM    Specimen: Blood   Result Value Ref Range    Total Cholesterol 135 0 - 200 mg/dL    Triglycerides 113 0 - 150 mg/dL    HDL Cholesterol 34 (L) 40 - 60 mg/dL    LDL Cholesterol  80 0 - 100 mg/dL    VLDL Cholesterol 21 5 - 40 mg/dL    LDL/HDL Ratio 2.31    CBC Auto Differential    Collection Time: 03/30/24  4:50 AM    Specimen: Blood   Result Value Ref Range    WBC 8.51 3.40 - 10.80  10*3/mm3    RBC 3.96 (L) 4.14 - 5.80 10*6/mm3    Hemoglobin 10.5 (L) 13.0 - 17.7 g/dL    Hematocrit 36.0 (L) 37.5 - 51.0 %    MCV 90.9 79.0 - 97.0 fL    MCH 26.5 (L) 26.6 - 33.0 pg    MCHC 29.2 (L) 31.5 - 35.7 g/dL    RDW 14.8 12.3 - 15.4 %    RDW-SD 49.8 37.0 - 54.0 fl    MPV 9.5 6.0 - 12.0 fL    Platelets 241 140 - 450 10*3/mm3   Scan Slide    Collection Time: 03/30/24  4:50 AM    Specimen: Blood   Result Value Ref Range    Scan Slide     Manual Differential    Collection Time: 03/30/24  4:50 AM    Specimen: Blood   Result Value Ref Range    Neutrophil % 92.0 (H) 42.7 - 76.0 %    Lymphocyte % 7.0 (L) 19.6 - 45.3 %    Eosinophil % 1.0 0.3 - 6.2 %    Neutrophils Absolute 7.83 (H) 1.70 - 7.00 10*3/mm3    Lymphocytes Absolute 0.60 (L) 0.70 - 3.10 10*3/mm3    Eosinophils Absolute 0.09 0.00 - 0.40 10*3/mm3    nRBC 1.0 (H) 0.0 - 0.2 /100 WBC    Anisocytosis Slight/1+ None Seen    WBC Morphology Normal Normal    Platelet Estimate Adequate Normal        Imaging:  No results found.     Current:  budesonide, 0.5 mg, Nebulization, BID - RT  cefTRIAXone, 2,000 mg, Intravenous, Q24H  famotidine, 20 mg, Oral, BID AC  ipratropium-albuterol, 3 mL, Nebulization, 4x Daily - RT  methylPREDNISolone sodium succinate, 60 mg, Intravenous, Q8H  sodium chloride, 10 mL, Intravenous, Q12H        Infusion:  dilTIAZem, 5-15 mg/hr  sodium chloride, 40 mL/hr, Last Rate: 40 mL/hr (03/29/24 0592)         PRN:    ipratropium-albuterol    sodium chloride    sodium chloride    ASSESSMENT:  Acute respiratory distress  Pneumonia   COPD  Asthma  Idiopathic scoliosis  A-fib  Hx PE/DVT  GERD  Anemia   RLS  Panic attacks/Anxiety           PLAN:  If no improvement, consider bronchoscopy  Antibiotics  Bronchodilators  Inhaled corticosteroids  IV steroids   Mucomyst  Incentive spirometer  O2 support   Electrolytes/ glycemic control  No DVT and GI prophylaxis    Discussed with Dr Jt Santos, APRN  3/30/2024  11:10 EDT      I personally have  examined  and interviewed the patient. I have reviewed the history, data, problems, assessment and plan with our NP.  Total Critical care time in direct medical management (   ) minutes, This time specifically excludes time spent performing procedures.    Amandeep Waters MD   3/30/2024  11:25 EDT

## 2024-03-30 NOTE — THERAPY EVALUATION
Patient Name: Orlando Velasquez  : 1950    MRN: [id number]                              Today's Date: 3/30/2024       Admit Date: 3/29/2024    Visit Dx: No diagnosis found.  Patient Active Problem List   Diagnosis    Respiratory failure     Past Medical History:   Diagnosis Date    A-fib     Arthritis     Asthma     Cancer     COPD (chronic obstructive pulmonary disease)     DVT (deep venous thrombosis)     GERD (gastroesophageal reflux disease)     Hernia, abdominal     History of transfusion     PE (pulmonary thromboembolism)     Presence of IVC filter      History reviewed. No pertinent surgical history.   General Information       Row Name 24 1441          Physical Therapy Time and Intention    Document Type evaluation  -EL     Mode of Treatment individual therapy;physical therapy  -       Row Name 24 144          General Information    Patient Profile Reviewed yes  -EL     Prior Level of Function independent:;transfer  ambulates very short distance in home, limited by endurance. w/c for community mobility  -EL       Row Name 24 1441          Living Environment    People in Home spouse  -       Row Name 24 1441          Cognition    Orientation Status (Cognition) oriented x 4  -       Row Name 24 144          Safety Issues, Functional Mobility    Impairments Affecting Function (Mobility) shortness of breath;strength;endurance/activity tolerance  -EL               User Key  (r) = Recorded By, (t) = Taken By, (c) = Cosigned By      Initials Name Provider Type    Sj Bueno PT Physical Therapist                   Mobility       Row Name 24 1447          Bed Mobility    Bed Mobility supine-sit  -EL     Supine-Sit [place] (Bed Mobility) standby assist  -       Row Name 24 144          Bed-Chair Transfer    Bed-Chair [place] (Transfers) minimum assist (75% patient effort)  -EL     Assistive Device (Bed-Chair Transfers) walker, front-wheeled  -EL        Row Name 03/30/24 1447          Sit-Stand Transfer    Sit-Stand [place] (Transfers) minimum assist (75% patient effort)  -EL     Assistive Device (Sit-Stand Transfers) walker, front-wheeled  -EL       Row Name 03/30/24 1447          Gait/Stairs (Locomotion)    [place] Level (Gait) minimum assist (75% patient effort)  -EL     Assistive Device (Gait) walker, front-wheeled  -EL     Distance in Feet (Gait) 6  -EL     Deviations/Abnormal Patterns (Gait) gait speed decreased  -EL     Comment, (Gait/Stairs) desat to 85% following short ambulatory transfer to bedside chair.  -EL               User Key  (r) = Recorded By, (t) = Taken By, (c) = Cosigned By      Initials Name Provider Type    Sj Bueno, JERMAINE Physical Therapist                   Obj/Interventions       Row Name 03/30/24 1451          Range of Motion Comprehensive    General Range of Motion bilateral lower extremity ROM WFL  -EL       Row Name 03/30/24 1451          Strength Comprehensive (MMT)    General Manual Muscle Testing (MMT) Assessment lower extremity strength deficits identified  -EL       Row Name 03/30/24 1451          Balance    Balance Assessment sitting static balance;standing static balance;standing dynamic balance  -EL     Static Sitting Balance independent  -EL     Static Standing Balance minimal assist  -EL     Dynamic Standing Balance minimal assist  -EL       Row Name 03/30/24 1451          Sensory Assessment (Somatosensory)    Sensory Assessment (Somatosensory) sensation intact  -EL               User Key  (r) = Recorded By, (t) = Taken By, (c) = Cosigned By      Initials Name Provider Type    Sj Bueno, PT Physical Therapist                   Goals/Plan       Row Name 03/30/24 1501          Bed Mobility Goal 1 (PT)    Activity/Assistive Device (Bed Mobility Goal 1, PT) bed mobility activities, all  -EL     [place] Level/Cues Needed (Bed Mobility Goal 1, PT) modified independence  -EL     Time Frame (Bed Mobility  Goal 1, PT) long term goal (LTG);2 weeks  -EL       Row Name 03/30/24 1501          Transfer Goal 1 (PT)    Activity/Assistive Device (Transfer Goal 1, PT) transfers, all;walker, rolling  -EL     [place] Level/Cues Needed (Transfer Goal 1, PT) modified independence  -EL     Time Frame (Transfer Goal 1, PT) long term goal (LTG);2 weeks  -EL       Row Name 03/30/24 1501          Gait Training Goal 1 (PT)    Activity/Assistive Device (Gait Training Goal 1, PT) gait (walking locomotion);walker, rolling  -EL     [place] Level (Gait Training Goal 1, PT) modified independence  -EL     Distance (Gait Training Goal 1, PT) 50  -EL     Time Frame (Gait Training Goal 1, PT) long term goal (LTG);2 weeks  -EL       Row Name 03/30/24 1501          Therapy Assessment/Plan (PT)    Planned Therapy Interventions (PT) neuromuscular re-education;balance training;transfer training;bed mobility training;gait training;patient/family education;strengthening  -EL               User Key  (r) = Recorded By, (t) = Taken By, (c) = Cosigned By      Initials Name Provider Type    Sj Bueno, PT Physical Therapist                   Clinical Impression       Row Name 03/30/24 1454          Pain    Pretreatment Pain Rating 0/10 - no pain  -EL     Posttreatment Pain Rating 0/10 - no pain  -EL       Row Name 03/30/24 1454          Plan of Care Review    Plan of Care Reviewed With patient;spouse  -EL     Outcome Evaluation Pt is a 74 YO M admitted with resp failure from home. Pt reports living at home wiht spouse, with ramp to enter. Pt states he generally is independent with ADLs, ambulation with RWx and using w/c for community mobility. Pt and spouse state pt is mostly limited by endurance, has had 2 falls recently. Pt this date demonstrates good mobility, requiring CGA/MIN A for all mobility. Pt sat in chair following mobility assessment and had desat to 85% on 2L O2NC. Following short seated rest break, recovered >90%. Pt would benefit  from SNF at d/c, PT to continue to follow while admitted.  -EL       Row Name 03/30/24 1454          Therapy Assessment/Plan (PT)    Rehab Potential (PT) good, to achieve stated therapy goals  -EL     Criteria for Skilled Interventions Met (PT) yes  -EL     Therapy Frequency (PT) 5 times/wk  -EL       Row Name 03/30/24 1454          Vital Signs    Pre SpO2 (%) 91  -EL     O2 Delivery Pre Treatment supplemental O2  -EL     Intra SpO2 (%) 85  -EL     O2 Delivery Intra Treatment supplemental O2  -EL     Post SpO2 (%) 91  -EL     O2 Delivery Post Treatment supplemental O2  -EL     Pre Patient Position Supine  -EL     Intra Patient Position Standing  -EL     Post Patient Position Sitting  -EL       Row Name 03/30/24 1454          Positioning and Restraints    Pre-Treatment Position in bed  -EL     Post Treatment Position chair  -EL     In Chair notified nsg;reclined;call light within reach;encouraged to call for assist;exit alarm on  -EL               User Key  (r) = Recorded By, (t) = Taken By, (c) = Cosigned By      Initials Name Provider Type    Sj Bueno, PT Physical Therapist                   Outcome Measures       Row Name 03/30/24 1502 03/30/24 1000       How much help from another person do you currently need...    Turning from your back to your side while in flat bed without using bedrails? 3  -EL 3  -PS    Moving from lying on back to sitting on the side of a flat bed without bedrails? 3  -EL 3  -PS    Moving to and from a bed to a chair (including a wheelchair)? 3  -EL 3  -PS    Standing up from a chair using your arms (e.g., wheelchair, bedside chair)? 2  -EL 2  -PS    Climbing 3-5 steps with a railing? 1  -EL 2  -PS    To walk in hospital room? 2  -EL 2  -PS    AM-PAC 6 Clicks Score (PT) 14  -EL 15  -PS    Highest Level of Mobility Goal 4 --> Transfer to chair/commode  -EL 4 --> Transfer to chair/commode  -PS      Row Name 03/30/24 0800          How much help from another person do you currently need...     Turning from your back to your side while in flat bed without using bedrails? 3  -PS     Moving from lying on back to sitting on the side of a flat bed without bedrails? 3  -PS     Moving to and from a bed to a chair (including a wheelchair)? 3  -PS     Standing up from a chair using your arms (e.g., wheelchair, bedside chair)? 2  -PS     Climbing 3-5 steps with a railing? 2  -PS     To walk in hospital room? 2  -PS     AM-PAC 6 Clicks Score (PT) 15  -PS     Highest Level of Mobility Goal 4 --> Transfer to chair/commode  -PS       Row Name 03/30/24 1502          Functional Assessment    Outcome Measure Options AM-PAC 6 Clicks Basic Mobility (PT)  -               User Key  (r) = Recorded By, (t) = Taken By, (c) = Cosigned By      Initials Name Provider Type    Sj Bueno, PT Physical Therapist    Chas Grant, RN Registered Nurse                                 Physical Therapy Education       Title: PT OT SLP Therapies (Done)       Topic: Physical Therapy (Done)       Point: Mobility training (Done)       Learning Progress Summary             Patient Acceptance, E,TB, VU by  at 3/30/2024 1502                         Point: Precautions (Done)       Learning Progress Summary             Patient Acceptance, E,TB, VU by  at 3/30/2024 1502                                         User Key       Initials Effective Dates Name Provider Type Discipline     06/23/20 -  Sj Ha, PT Physical Therapist PT                  PT Recommendation and Plan  Planned Therapy Interventions (PT): neuromuscular re-education, balance training, transfer training, bed mobility training, gait training, patient/family education, strengthening  Plan of Care Reviewed With: patient, spouse  Outcome Evaluation: Pt is a 74 YO M admitted with resp failure from home. Pt reports living at home wiht spouse, with ramp to enter. Pt states he generally is independent with ADLs, ambulation with RWx and using w/c for community  mobility. Pt and spouse state pt is mostly limited by endurance, has had 2 falls recently. Pt this date demonstrates good mobility, requiring CGA/MIN A for all mobility. Pt sat in chair following mobility assessment and had desat to 85% on 2L O2NC. Following short seated rest break, recovered >90%. Pt would benefit from SNF at d/c, PT to continue to follow while admitted.     Time Calculation:   PT Evaluation Complexity  History, PT Evaluation Complexity: 1-2 personal factors and/or comorbidities  Examination of Body Systems (PT Eval Complexity): total of 3 or more elements  Clinical Presentation (PT Evaluation Complexity): evolving  Clinical Decision Making (PT Evaluation Complexity): moderate complexity  Overall Complexity (PT Evaluation Complexity): moderate complexity     PT Charges       Row Name 03/30/24 1503             Time Calculation    Start Time 1022  -EL      Stop Time 1042  -EL      Time Calculation (min) 20 min  -EL      PT Received On 03/30/24  -EL      PT - Next Appointment 04/01/24  -EL      PT Goal Re-Cert Due Date 04/13/24  -EL                User Key  (r) = Recorded By, (t) = Taken By, (c) = Cosigned By      Initials Name Provider Type    Sj Bueno PT Physical Therapist                  Therapy Charges for Today       Code Description Service Date Service Provider Modifiers Qty    17089648502 HC PT EVAL MOD COMPLEXITY 4 3/30/2024 Sj Ha, PT GP 1            PT G-Codes  Outcome Measure Options: AM-PAC 6 Clicks Basic Mobility (PT)  AM-PAC 6 Clicks Score (PT): 14  PT Discharge Summary  Anticipated Discharge Disposition (PT): skilled nursing facility    Sj Ha PT  3/30/2024

## 2024-03-30 NOTE — PLAN OF CARE
Goal Outcome Evaluation:              Outcome Evaluation: Patient requested nebulizer treatment at 0330, doctor was notified and one time dose was ordered. Patient remained NPO after midnight for possible bronch today. Patient was pleasent, slept between care, and had no complaints of pain.

## 2024-03-30 NOTE — PLAN OF CARE
Goal Outcome Evaluation:   Orders received for swallow evaluation. Chart reviewed and events noted. Patient currently NPO for possible Bronchoscopy today. Unable to assess at this time due to this. ST will follow and remain available to complete swallow evaluation when he is able to resume po. Thank you for this consult

## 2024-03-30 NOTE — THERAPY EVALUATION
Acute Care - Speech Language Pathology   Swallow Initial Evaluation  [facility]     Patient Name: Orlando Velasquez  : 1950  MRN: [id number]  Today's Date: 3/30/2024               Admit Date: 3/29/2024    Visit Dx:   No diagnosis found.  Patient Active Problem List   Diagnosis    Respiratory failure     Past Medical History:   Diagnosis Date    A-fib     Arthritis     Asthma     Cancer     COPD (chronic obstructive pulmonary disease)     DVT (deep venous thrombosis)     GERD (gastroesophageal reflux disease)     Hernia, abdominal     History of transfusion     PE (pulmonary thromboembolism)     Presence of IVC filter      History reviewed. No pertinent surgical history.    SLP Recommendation and Plan  SLP Swallowing Diagnosis: mild, oral dysphagia, R/O pharyngeal dysphagia (24 1200)  SLP Diet Recommendation: regular textures, thin liquids (24)  Recommended Precautions and Strategies: upright posture during/after eating, small bites of food and sips of liquid, alternate between small bites of food and sips of liquid, check mouth frequently for oral residue/pocketing, general aspiration precautions, assist with feeding (24 1200)  SLP Rec. for Method of Medication Administration: meds whole, meds crushed, with thin liquids, with puree, as tolerated (24 1200)     Monitor for Signs of Aspiration: yes, notify SLP if any concerns, cough, elevated WBC count, gurgly voice, throat clearing, fever, upper respiratory, pneumonia, right lower lobe infiltrates (24 1200)  Recommended Diagnostics: reassess via clinical swallow evaluation, reassess via VFSS (MBS), other (see comments) (VFSS if indicated) (24 1200)  Swallow Criteria for Skilled Therapeutic Interventions Met: demonstrates skilled criteria (24 1200)     Rehab Potential/Prognosis, Swallowing: good, to achieve stated therapy goals (24 1200)  Therapy Frequency (Swallow): 2 days per week, 3 days per week (24  1200)  Predicted Duration Therapy Intervention (Days): until discharge (03/30/24 1200)  Oral Care Recommendations: Oral Care before breakfast, after meals and PRN (03/30/24 1200)  Demonstrates Need for Referral to Another Service: occupational therapy (03/30/24 1200)            SWALLOW EVALUATION (Last 72 Hours)       SLP Adult Swallow Evaluation       Row Name 03/30/24 1200       Rehab Evaluation    Document Type evaluation  -SM    Subjective Information complains of  Upper extremity tremors/shaking  -SM    Patient Observations alert;cooperative;agree to therapy  -SM    Patient/Family/Caregiver Comments/Observations Wife present  -SM    Patient Effort good  -SM    Comment The patient was seen today for a clinical swallow evaluation.  -SM    Symptoms Noted During/After Treatment none  -SM       General Information    Patient Profile Reviewed yes  -SM    Pertinent History Of Current Problem Per H&P: Orlando Velasquez is a 73 y.o. male with a CMH of hypertension, hyperlipidemia, COPD, chronic respiratory failure presenting from outside facility with difficulty breathing.  He was being treated for pneumonia and COPD exacerbation and acute hypoxic hypercapnic respiratory failure.  He was kept on BiPAP intermittently.  Patient had CT scan done at outside facility which showed possible mucous plugging and he was transferred here for possible pulmonology consult and bronchoscopy.  As of right now patient is on 4 L of supplemental oxygen, at baseline he wears 3 L at home.  Patient also fell down a few days ago and has bruising on his face patient was also diagnosed with rhabdomyolysis and was being treated with IV fluids at outside facility.     He is currently on 3 L supplemental 02 and a swallow evaluation was requested due to history of pneumonia.      -SM    Current Method of Nutrition regular textures;thin liquids;other (see comments)  Cardiac, healthy heart  -SM    Precautions/Limitations, Vision WFL with corrective  lenses;for purposes of eval  -SM    Precautions/Limitations, Hearing WFL;for purposes of eval  -SM    Prior Level of Function-Communication WFL  -SM    Prior Level of Function-Swallowing no diet consistency restrictions;regular textures;thin liquids  -SM    Plans/Goals Discussed with patient and family;other (see comments)  Nurse  -SM       Oral Motor Structure and Function    Dentition Assessment upper dentures/partial in place;lower dentures/partial in place;other (see comments)  Fit is good  -SM    Secretion Management WNL/WFL  -SM    Mucosal Quality moist, healthy  -       Oral Musculature and Cranial Nerve Assessment    Oral Motor General Assessment generalized oral motor weakness  -SM    Oral Motor, Comment The patient is able to protrude his tongue upon request, lateralize to the R and L, and elevate to the alveolar ridge with no overt weakness or asymmetry. Labial protrusion/retraction WFL bilaterally. No weakness or asymmetry appreciated. He wears full upper and lower dentures plates. Fit is good. He states he is unable to eat without his dentures. He is verbal and presents with good intelligibility. No hoarse/wet vocal quality audible  -SM       General Eating/Swallowing Observations    Respiratory Support Currently in Use nasal cannula  -SM    O2 Liters 3L  -SM    Eating/Swallowing Skills self-fed  -SM    Positioning During Eating upright 90 degree;upright in chair  -    Utensils Used straw;spoon  -SM    Consistencies Trialed thin liquids;pureed;regular textures  -SM       Clinical Swallow Eval    Clinical Swallow Evaluation Summary The patient was seen today for a clinical swallow evaluation. Attempted to see patient earlier however he was NPO for a possible Bronchoscopy. Per nurse report, this was canceled and po diet resumed (current diet is regular consistency, thin liquids, cardiac, healthy heart). No difficulties with swallowing reported by the nurse. Upon entrance to his room he was seated up  "at a 90 degree angle in a chair. Wife/family member present. An oral motor assessment was completed (see above). The patient was able to self feed all trials/consistencies and was assessed with the following: thin liquid by straw x several sips, puree (applesauce) x several trials, and regular solid (peanut butter/cracker). He did not want to attempt mechanical soft/soft to chew as he did not like food items offered. Labial seal on straw was slightly reduced, however functional. He states he has to use a straw to drink lately as he will have bilateraly anterior labial spillage when drinking from a cup. Both the patient and wife state this has been occuring for the past several months. He was able adequately pull thin liquids via straw well. No anterior spillage noted with thins or any consistency during this assessment. Functional oral transit/bolus control noted. He clears the oral cavity well with no residue evident. No overt pharyngeal findings noted during this session. He presents with a clear vocal quality, no coughing, throat clearing or \"wet\" vocal quality noted. It should be noted that throughout the session he presented with tremulous upper extremities, bilaterally, with increased shaking noted with intentional movement. Wife states she feels this is related to the steroids however at times is worse and she has to feed him.    RECOMMENDATIONS:  It is recommended that the patient continue his current diet of regular consistency, thin liquids (cardiac/healthy heart) at this time. He should continue to be up at a full 90 degree angle for all meals/medications. Assist with feeding if indicated due to tremors/shaking of his upper extremities noted today. Monitor closely for any overt s/s of difficulty and if noted, stop feeding and notify this department and/or MD. ST will follow up with a full meal assessment and additional goals/recommendations to follow. Discussed at length with patient and his wife. All in " agreement with plan and recommendations. He may benefit from an OT evaluation due to difficulties with self feeding evident during this session as evidenced by the tremulous upper extremities.       -       SLP Evaluation Clinical Impression    SLP Swallowing Diagnosis mild;oral dysphagia;R/O pharyngeal dysphagia  -    Functional Impact risk of aspiration/pneumonia;risk of malnutrition;risk of dehydration  -    Rehab Potential/Prognosis, Swallowing good, to achieve stated therapy goals  -    Swallow Criteria for Skilled Therapeutic Interventions Met demonstrates skilled criteria  -       Recommendations    Therapy Frequency (Swallow) 2 days per week;3 days per week  -    Predicted Duration Therapy Intervention (Days) until discharge  -    SLP Diet Recommendation regular textures;thin liquids  -    Recommended Diagnostics reassess via clinical swallow evaluation;reassess via VFSS (MBS);other (see comments)  VFSS if indicated  -    Recommended Precautions and Strategies upright posture during/after eating;small bites of food and sips of liquid;alternate between small bites of food and sips of liquid;check mouth frequently for oral residue/pocketing;general aspiration precautions;assist with feeding  -    Oral Care Recommendations Oral Care before breakfast, after meals and PRN  -    SLP Rec. for Method of Medication Administration meds whole;meds crushed;with thin liquids;with puree;as tolerated  -    Monitor for Signs of Aspiration yes;notify SLP if any concerns;cough;elevated WBC count;gurgly voice;throat clearing;fever;upper respiratory;pneumonia;right lower lobe infiltrates  -    Demonstrates Need for Referral to Another Service occupational therapy  -       Swallow Goals (SLP)    Swallow LTGs Swallow Long Term Goal (free text)  -    Swallow STGs diet tolerance goal selection (SLP);swallow management recall goal selection (SLP)  -    Diet Tolerance Goal Selection (SLP) Swallow Short  Term Goal 1;Swallow Short Term Goal 2  -SM    Swallow Management Recall Goal Selection (SLP) swallow management recall, SLP goal 1  -SM       (LTG) Swallow    (LTG) Swallow The patient will maximize swallow function for least restrictive po diet, exhibiting no complications associated with dysphagia, adequate po intake, and demonstrating independent use of safe swallow compensations.  -SM    [place] (Swallow Long Term Goal) independently (over 90% accuracy)  -SM    Time Frame (Swallow Long Term Goal) by discharge  -SM    Progress/Outcomes (Swallow Long Term Goal) new goal  -SM    Comment (Swallow Long Term Goal) It is recommended that the patient continue his current diet of regular consistency, thin liquids (cardiac/healthy heart) at this time. He should continue to be up at a full 90 degree angle for all meals/medications. Assist with feeding if indicated due to tremors/shaking of his upper extremities noted today. Monitor closely for any overt s/s of difficulty and if noted, stop feeding and notify this department and/or MD. ST will follow up with a full meal assessment and additional goals/recommendations to follow. Discussed at length with patient and his wife. All in agreement with plan and recommendations. He may benefit from an OT evaluation due to difficulties with self feeding evident during this session as evidenced by the tremulous upper extremities.  -SM       (STG) Swallow 1    (STG) Swallow 1 The patient will participate in ongoing assessment of swallow to determine need for further swallow re-evaluation and appropriateness/need for an instrumental assessment of swallow  -SM    [place] (Swallow Short Term Goal 1) with moderate cues (50-74% accuracy)  -SM    Time Frame (Swallow Short Term Goal 1) 1 week  -SM    Progress/Outcomes (Swallow Short Term Goal 1) new goal  -SM       (STG) Swallow 2    (STG) Swallow 2 The patient will participate in ongoing assessment of swallow to determine need for  further swallow re-evaluation and appropriateness/need for an instrumental assessment of swallow, as well as participate in patient and caregiver education  -SM    [place] (Swallow Short Term Goal 2) with maximum cues (25-49% accuracy)  -SM    Time Frame (Swallow Short Term Goal 2) 1 week  -SM    Progress/Outcomes (Swallow Short Term Goal 2) new goal  -SM       (STG) Swallow Management Recall Goal 1 (SLP)    Activity (Swallow Management Recall Goal 1, SLP) independent recall of;aspiration precautions  -SM    [place]/Accuracy (Swallow Management Recall Goal 1, SLP) with maximum cues (25-49% accuracy)  -SM    Time Frame (Swallow Management Recall Goal 1, SLP) short term goal (STG)  -SM    Progress/Outcomes (Swallow Management Recall Goal 1, SLP) new goal  -SM              User Key  (r) = Recorded By, (t) = Taken By, (c) = Cosigned By      Initials Name Effective Dates    Agnes Hill, NIKKI 06/16/21 -                     EDUCATION  The patient has been educated in the following areas:   Dysphagia (Swallowing Impairment).        SLP GOALS       Row Name 03/30/24 1200       (LTG) Swallow    (LTG) Swallow The patient will maximize swallow function for least restrictive po diet, exhibiting no complications associated with dysphagia, adequate po intake, and demonstrating independent use of safe swallow compensations.  -SM    [place] (Swallow Long Term Goal) independently (over 90% accuracy)  -SM    Time Frame (Swallow Long Term Goal) by discharge  -SM    Progress/Outcomes (Swallow Long Term Goal) new goal  -SM    Comment (Swallow Long Term Goal) It is recommended that the patient continue his current diet of regular consistency, thin liquids (cardiac/healthy heart) at this time. He should continue to be up at a full 90 degree angle for all meals/medications. Assist with feeding if indicated due to tremors/shaking of his upper extremities noted today. Monitor closely for any overt s/s of difficulty and if  noted, stop feeding and notify this department and/or MD. ST will follow up with a full meal assessment and additional goals/recommendations to follow. Discussed at length with patient and his wife. All in agreement with plan and recommendations. He may benefit from an OT evaluation due to difficulties with self feeding evident during this session as evidenced by the tremulous upper extremities.  -SM       (STG) Swallow 1    (STG) Swallow 1 The patient will participate in ongoing assessment of swallow to determine need for further swallow re-evaluation and appropriateness/need for an instrumental assessment of swallow  -SM    [place] (Swallow Short Term Goal 1) with moderate cues (50-74% accuracy)  -SM    Time Frame (Swallow Short Term Goal 1) 1 week  -SM    Progress/Outcomes (Swallow Short Term Goal 1) new goal  -SM       (STG) Swallow 2    (STG) Swallow 2 The patient will participate in ongoing assessment of swallow to determine need for further swallow re-evaluation and appropriateness/need for an instrumental assessment of swallow, as well as participate in patient and caregiver education  -SM    [place] (Swallow Short Term Goal 2) with maximum cues (25-49% accuracy)  -SM    Time Frame (Swallow Short Term Goal 2) 1 week  -SM    Progress/Outcomes (Swallow Short Term Goal 2) new goal  -SM       (STG) Swallow Management Recall Goal 1 (SLP)    Activity (Swallow Management Recall Goal 1, SLP) independent recall of;aspiration precautions  -SM    [place]/Accuracy (Swallow Management Recall Goal 1, SLP) with maximum cues (25-49% accuracy)  -SM    Time Frame (Swallow Management Recall Goal 1, SLP) short term goal (STG)  -SM    Progress/Outcomes (Swallow Management Recall Goal 1, SLP) new goal  -SM              User Key  (r) = Recorded By, (t) = Taken By, (c) = Cosigned By      Initials Name Provider Type    Agnes Hill, SLP Speech and Language Pathologist                       Time Calculation:                 Agnes Barr, SLP  3/30/2024

## 2024-03-30 NOTE — PLAN OF CARE
Goal Outcome Evaluation:      Patient remained NPO this morning. Pulmonology rounded and is treating pneumonia medically, no bronch at this time. Patient placed on heart healthy diet. Patient worked with physical therapy. Therapy stated that patient did well. Plan of care ongoing.

## 2024-03-30 NOTE — PROGRESS NOTES
Referring Provider: Roby Stacy MD    Reason for follow-up: Atrial fibrillation     Patient Care Team:  Jagdeep Dailey DO as PCP - General (Internal Medicine)      SUBJECTIVE  Remains short of breath.  Remains in sinus rhythm     ROS  Review of all systems negative except as indicated.    Since I have last seen, the patient has been without any chest discomfort, shortness of breath, palpitations, dizziness or syncope.  Denies having any headache, abdominal pain, nausea, vomiting, diarrhea, constipation, loss of weight or loss of appetite.  Denies having any excessive bruising, hematuria or blood in the stool.  ROS      Personal History:    Past Medical History:   Diagnosis Date    A-fib     Arthritis     Asthma     Cancer     COPD (chronic obstructive pulmonary disease)     DVT (deep venous thrombosis)     GERD (gastroesophageal reflux disease)     Hernia, abdominal     History of transfusion     PE (pulmonary thromboembolism)     Presence of IVC filter        History reviewed. No pertinent surgical history.    History reviewed. No pertinent family history.    Social History     Tobacco Use    Smoking status: Former     Current packs/day: 0.00     Types: Cigarettes     Quit date:      Years since quittin.2     Passive exposure: Past    Smokeless tobacco: Never   Vaping Use    Vaping status: Never Used   Substance Use Topics    Alcohol use: Never    Drug use: Never        Home meds:  Prior to Admission medications    Medication Sig Start Date End Date Taking? Authorizing Provider   ALPRAZolam (XANAX) 1 MG tablet Take 1 tablet by mouth 3 (Three) Times a Day As Needed for Anxiety.    ProviderLeonora MD   apixaban (ELIQUIS) 5 MG tablet tablet Take 1 tablet by mouth 2 (Two) Times a Day.    ProviderLeonora MD   furosemide (LASIX) 40 MG tablet Take 1 tablet by mouth Daily.    Leonora Davis MD   omeprazole (priLOSEC) 40 MG capsule Take 1 capsule by mouth Daily.    Provider  "MD Leonora   predniSONE (DELTASONE) 5 MG tablet Take 1 tablet by mouth Daily.    Provider, MD Leonora   traZODone (DESYREL) 50 MG tablet Take 1 tablet by mouth 3 (Three) Times a Day.    Provider, MD Leonora       Allergies:  Patient has no known allergies.    Scheduled Meds:acetylcysteine, 3 mL, Nebulization, TID - RT  budesonide, 0.5 mg, Nebulization, BID - RT  cefTRIAXone, 2,000 mg, Intravenous, Q24H  famotidine, 20 mg, Oral, BID AC  ipratropium-albuterol, 3 mL, Nebulization, 4x Daily - RT  methylPREDNISolone sodium succinate, 60 mg, Intravenous, Q8H  sodium chloride, 10 mL, Intravenous, Q12H      Continuous Infusions:dilTIAZem, 5-15 mg/hr  sodium chloride, 40 mL/hr, Last Rate: 40 mL/hr (03/29/24 1712)      PRN Meds:.  ipratropium-albuterol    sodium chloride    sodium chloride      OBJECTIVE    Vital Signs  Vitals:    03/30/24 0957 03/30/24 1048 03/30/24 1052 03/30/24 1402   BP: 127/66   134/73   BP Location: Left arm   Left arm   Patient Position: Lying   Sitting   Pulse: 82 97 91 83   Resp: 21 20 18 17   Temp: 97.8 °F (36.6 °C)   98 °F (36.7 °C)   TempSrc: Oral   Oral   SpO2: 93% 91% 93% 94%   Weight:       Height:           Flowsheet Rows      Flowsheet Row First Filed Value   Admission Height 162.6 cm (64\") Documented at 03/29/2024 1640   Admission Weight 92.3 kg (203 lb 7.8 oz) Documented at 03/29/2024 1500              Intake/Output Summary (Last 24 hours) at 3/30/2024 1429  Last data filed at 3/29/2024 2300  Gross per 24 hour   Intake --   Output 350 ml   Net -350 ml          Telemetry: Sinus rhythm    Physical Exam:  The patient is alert, oriented and in mild respiratory distress  Vital signs as noted above.  Head and neck revealed no carotid bruits or jugular venous distention.  No thyromegaly or lymphadenopathy is present  Lungs clear.  No wheezing.  Breath sounds are normal bilaterally.  Heart normal first and second heart sounds.  No murmur. No precordial rub is present.  No gallop is " present.  Abdomen soft and nontender.  No organomegaly is present.  Extremities with good peripheral pulses without any pedal edema.  Skin warm and dry.  Musculoskeletal system is grossly normal.  CNS grossly normal.       Results Review:  I have personally reviewed the results from the time of this admission to 3/30/2024 14:29 EDT and agree with these findings:  []  Laboratory  []  Microbiology  []  Radiology  []  EKG/Telemetry   []  Cardiology/Vascular   []  Pathology  []  Old records  []  Other:    Most notable findings include:    Lab Results (last 24 hours)       Procedure Component Value Units Date/Time    Magnesium [[id number]]  (Normal) Collected: 03/30/24 1251    Specimen: Blood Updated: 03/30/24 1331     Magnesium 2.1 mg/dL     Basic Metabolic Panel [[id number]]  (Abnormal) Collected: 03/30/24 1251    Specimen: Blood Updated: 03/30/24 1331     Glucose 137 mg/dL      BUN 23 mg/dL      Creatinine 0.85 mg/dL      Sodium 144 mmol/L      Potassium 4.2 mmol/L      Chloride 102 mmol/L      CO2 33.0 mmol/L      Calcium 8.9 mg/dL      BUN/Creatinine Ratio 27.1     Anion Gap 9.0 mmol/L      eGFR 92.3 mL/min/1.73     Narrative:      GFR Normal >60  Chronic Kidney Disease <60  Kidney Failure <15    The GFR formula is only valid for adults with stable renal function between ages 18 and 70.    Blood Culture - Blood, Hand, Left [[id number]] Collected: 03/30/24 1250    Specimen: Blood from Hand, Left Updated: 03/30/24 1258    Blood Culture - Blood, Arm, Right [[id number]] Collected: 03/30/24 1251    Specimen: Blood from Arm, Right Updated: 03/30/24 1258    Lipid Panel [[id number]]  (Abnormal) Collected: 03/30/24 0450    Specimen: Blood Updated: 03/30/24 0948     Total Cholesterol 135 mg/dL      Triglycerides 113 mg/dL      HDL Cholesterol 34 mg/dL      LDL Cholesterol  80 mg/dL      VLDL Cholesterol 21 mg/dL      LDL/HDL Ratio 2.31    Narrative:      Cholesterol Reference Ranges  (U.S. Department of Health and Human Services  ATP III Classifications)    Desirable          <200 mg/dL  Borderline High    200-239 mg/dL  High Risk          >240 mg/dL      Triglyceride Reference Ranges  (U.S. Department of Health and Human Services ATP III Classifications)    Normal           <150 mg/dL  Borderline High  150-199 mg/dL  High             200-499 mg/dL  Very High        >500 mg/dL    HDL Reference Ranges  (U.S. Department of Health and Human Services ATP III Classifications)    Low     <40 mg/dl (major risk factor for CHD)  High    >60 mg/dl ('negative' risk factor for CHD)        LDL Reference Ranges  (U.S. Department of Health and Human Services ATP III Classifications)    Optimal          <100 mg/dL  Near Optimal     100-129 mg/dL  Borderline High  130-159 mg/dL  High             160-189 mg/dL  Very High        >189 mg/dL    CBC & Differential [[id number]]  (Abnormal) Collected: 03/30/24 0450    Specimen: Blood Updated: 03/30/24 0607    Narrative:      The following orders were created for panel order CBC & Differential.  Procedure                               Abnormality         Status                     ---------                               -----------         ------                     CBC Auto Differential[[id number]]        Abnormal            Final result               Scan Slide[[id number]]                                       Final result                 Please view results for these tests on the individual orders.    CBC Auto Differential [[id number]]  (Abnormal) Collected: 03/30/24 0450    Specimen: Blood Updated: 03/30/24 0607     WBC 8.51 10*3/mm3      RBC 3.96 10*6/mm3      Hemoglobin 10.5 g/dL      Hematocrit 36.0 %      MCV 90.9 fL      MCH 26.5 pg      MCHC 29.2 g/dL      RDW 14.8 %      RDW-SD 49.8 fl      MPV 9.5 fL      Platelets 241 10*3/mm3     Narrative:      The previously reported component NRBC is no longer being reported. Previous result was 0.0 /100 WBC (Reference Range: 0.0-0.2 /100 WBC) on 3/30/2024 at 045 EDT.     Scan Slide [[id number]] Collected: 03/30/24 0450    Specimen: Blood Updated: 03/30/24 0607     Scan Slide --     Comment: See Manual Differential Results       Manual Differential [[id number]]  (Abnormal) Collected: 03/30/24 0450    Specimen: Blood Updated: 03/30/24 0607     Neutrophil % 92.0 %      Lymphocyte % 7.0 %      Eosinophil % 1.0 %      Neutrophils Absolute 7.83 10*3/mm3      Lymphocytes Absolute 0.60 10*3/mm3      Eosinophils Absolute 0.09 10*3/mm3      nRBC 1.0 /100 WBC      Anisocytosis Slight/1+     WBC Morphology Normal     Platelet Estimate Adequate    Lactic Acid, Plasma [[id number]]  (Normal) Collected: 03/30/24 0450    Specimen: Blood Updated: 03/30/24 0525     Lactate 1.5 mmol/L     Basic Metabolic Panel [[id number]]  (Abnormal) Collected: 03/30/24 0450    Specimen: Blood Updated: 03/30/24 0524     Glucose 116 mg/dL      BUN 19 mg/dL      Creatinine 0.75 mg/dL      Sodium 141 mmol/L      Potassium 4.5 mmol/L      Comment: Slight hemolysis detected by analyzer. Result may be falsely elevated.        Chloride 103 mmol/L      CO2 32.0 mmol/L      Calcium 8.2 mg/dL      BUN/Creatinine Ratio 25.3     Anion Gap 6.0 mmol/L      eGFR 95.9 mL/min/1.73     Narrative:      GFR Normal >60  Chronic Kidney Disease <60  Kidney Failure <15    The GFR formula is only valid for adults with stable renal function between ages 18 and 70.    Magnesium [[id number]]  (Normal) Collected: 03/30/24 0450    Specimen: Blood Updated: 03/30/24 0524     Magnesium 2.0 mg/dL     Blood Gas, Arterial - [[id number]]  (Abnormal) Collected: 03/29/24 1606    Specimen: Arterial Blood Updated: 03/29/24 1612     Site Right Radial     Oscar's Test Positive     pH, Arterial 7.371 pH units      pCO2, Arterial 66.4 mm Hg      pO2, Arterial 110.1 mm Hg      HCO3, Arterial 38.5 mmol/L      Base Excess, Arterial 10.8 mmol/L      Comment: Serial Number: [id number]:  [id number]        O2 Saturation, Arterial 97.9 %      CO2 Content 40.6 mmol/L       Barometric Pressure for Blood Gas --     Comment: N/A        Modality Cannula     FIO2 36 %      Hemodilution No            Imaging Results (Last 24 Hours)       ** No results found for the last 24 hours. **            LAB RESULTS (LAST 7 DAYS)    CBC  Results from last 7 days   Lab Units 03/30/24  0450   WBC 10*3/mm3 8.51   RBC 10*6/mm3 3.96*   HEMOGLOBIN g/dL 10.5*   HEMATOCRIT % 36.0*   MCV fL 90.9   PLATELETS 10*3/mm3 241       BMP  Results from last 7 days   Lab Units 03/30/24  1251 03/30/24  0450   SODIUM mmol/L 144 141   POTASSIUM mmol/L 4.2 4.5   CHLORIDE mmol/L 102 103   CO2 mmol/L 33.0* 32.0*   BUN mg/dL 23 19   CREATININE mg/dL 0.85 0.75*   GLUCOSE mg/dL 137* 116*   MAGNESIUM mg/dL 2.1 2.0       CMP   Results from last 7 days   Lab Units 03/30/24  1251 03/30/24  0450   SODIUM mmol/L 144 141   POTASSIUM mmol/L 4.2 4.5   CHLORIDE mmol/L 102 103   CO2 mmol/L 33.0* 32.0*   BUN mg/dL 23 19   CREATININE mg/dL 0.85 0.75*   GLUCOSE mg/dL 137* 116*       BNP        TROPONIN        CoAg        Creatinine Clearance  Estimated Creatinine Clearance: 79.4 mL/min (by C-G formula based on SCr of 0.85 mg/dL).    ABG  Results from last 7 days   Lab Units 03/29/24  1606   PH, ARTERIAL pH units 7.371   PCO2, ARTERIAL mm Hg 66.4*   PO2 ART mm Hg 110.1*   O2 SATURATION ART % 97.9   BASE EXCESS ART mmol/L 10.8*       Radiology  No radiology results for the last day      EKG  I personally viewed and interpreted the patient's EKG/Telemetry data:  ECG 12 Lead Rhythm Change   Preliminary Result   HEART RATE= 72  bpm   RR Interval= 836  ms   NH Interval= 153  ms   P Horizontal Axis= 28  deg   P Front Axis= 40  deg   QRSD Interval= 90  ms   QT Interval= 353  ms   QTcB= 386  ms   QRS Axis= -39  deg   T Wave Axis= 64  deg   - OTHERWISE NORMAL ECG -   Sinus rhythm   Left axis deviation   No previous ECG available for comparison   Electronically Signed By:    Date and Time of Study: 2024-03-29 17:35:11            Echocardiogram:           Stress Test:         Cardiac Catheterization:  No results found for this or any previous visit.         Other:         ASSESSMENT & PLAN:    Principal Problem:    Respiratory failure    Atrial fibrillation  [id number] score is 4  Hold anticoagulation for possible bronchoscopy  Currently in sinus bradycardia  Stop Cardizem  Can use IV amiodarone if needed for RVR  Plan to resume anticoagulation after all procedures have been performed.  Avoiding beta-blocker due to severe COPD.  RVR appears to be precipitated by respiratory distress.    Rhabdomyolysis  Renal function has normalized.  IV fluids can be stopped     Hyperlipidemia  Start high intensity statin  LDL 80, HDL 34, triglyceride 113 and total cholesterol 135     COPD/pneumonia  Acute hypoxemic hypercapnic respiratory failure.  Currently on antibiotics, bronchodilators and steroids  Supplemental oxygen as needed  Possible bronchoscopy if respiratory status does not improve     History of pulmonary embolism in 2018  History of IVC filter which was removed in 2019     Obesity  BMI of 35  Obesity complicates all aspects of his care  Lifestyle modifications recommended to the patient      Clinton Fisher MD  03/30/24  14:29 EDT

## 2024-03-30 NOTE — PLAN OF CARE
Goal Outcome Evaluation:  Plan of Care Reviewed With: patient, spouse           Outcome Evaluation: Pt is a 72 YO M admitted with resp failure from home. Pt reports living at home wiht spouse, with ramp to enter. Pt states he generally is independent with ADLs, ambulation with RWx and using w/c for community mobility. Pt and spouse state pt is mostly limited by endurance, has had 2 falls recently. Pt this date demonstrates good mobility, requiring CGA/MIN A for all mobility. Pt sat in chair following mobility assessment and had desat to 85% on 2L O2NC. Following short seated rest break, recovered >90%. Pt would benefit from SNF at d/c, PT to continue to follow while admitted.      Anticipated Discharge Disposition (PT): skilled nursing facility

## 2024-03-31 ENCOUNTER — APPOINTMENT (OUTPATIENT)
Dept: GENERAL RADIOLOGY | Facility: HOSPITAL | Age: 74
End: 2024-03-31
Payer: MEDICARE

## 2024-03-31 LAB
ANION GAP SERPL CALCULATED.3IONS-SCNC: 6 MMOL/L (ref 5–15)
BASOPHILS # BLD AUTO: 0.02 10*3/MM3 (ref 0–0.2)
BASOPHILS NFR BLD AUTO: 0.2 % (ref 0–1.5)
BUN SERPL-MCNC: 25 MG/DL (ref 8–23)
BUN/CREAT SERPL: 33.3 (ref 7–25)
CALCIUM SPEC-SCNC: 8.3 MG/DL (ref 8.6–10.5)
CHLORIDE SERPL-SCNC: 106 MMOL/L (ref 98–107)
CO2 SERPL-SCNC: 31 MMOL/L (ref 22–29)
CREAT SERPL-MCNC: 0.75 MG/DL (ref 0.76–1.27)
DEPRECATED RDW RBC AUTO: 49.1 FL (ref 37–54)
EGFRCR SERPLBLD CKD-EPI 2021: 95.9 ML/MIN/1.73
EOSINOPHIL # BLD AUTO: 0 10*3/MM3 (ref 0–0.4)
EOSINOPHIL NFR BLD AUTO: 0 % (ref 0.3–6.2)
ERYTHROCYTE [DISTWIDTH] IN BLOOD BY AUTOMATED COUNT: 15.1 % (ref 12.3–15.4)
GLUCOSE SERPL-MCNC: 121 MG/DL (ref 65–99)
HCT VFR BLD AUTO: 33.7 % (ref 37.5–51)
HGB BLD-MCNC: 10 G/DL (ref 13–17.7)
IMM GRANULOCYTES # BLD AUTO: 0.25 10*3/MM3 (ref 0–0.05)
IMM GRANULOCYTES NFR BLD AUTO: 2.5 % (ref 0–0.5)
LYMPHOCYTES # BLD AUTO: 0.31 10*3/MM3 (ref 0.7–3.1)
LYMPHOCYTES NFR BLD AUTO: 3.1 % (ref 19.6–45.3)
MAGNESIUM SERPL-MCNC: 1.9 MG/DL (ref 1.6–2.4)
MCH RBC QN AUTO: 26.6 PG (ref 26.6–33)
MCHC RBC AUTO-ENTMCNC: 29.7 G/DL (ref 31.5–35.7)
MCV RBC AUTO: 89.6 FL (ref 79–97)
MONOCYTES # BLD AUTO: 0.29 10*3/MM3 (ref 0.1–0.9)
MONOCYTES NFR BLD AUTO: 2.9 % (ref 5–12)
NEUTROPHILS NFR BLD AUTO: 9.18 10*3/MM3 (ref 1.7–7)
NEUTROPHILS NFR BLD AUTO: 91.3 % (ref 42.7–76)
NRBC BLD AUTO-RTO: 0 /100 WBC (ref 0–0.2)
NT-PROBNP SERPL-MCNC: 2225 PG/ML (ref 0–900)
PLATELET # BLD AUTO: 247 10*3/MM3 (ref 140–450)
PMV BLD AUTO: 9.6 FL (ref 6–12)
POTASSIUM SERPL-SCNC: 4.2 MMOL/L (ref 3.5–5.2)
RBC # BLD AUTO: 3.76 10*6/MM3 (ref 4.14–5.8)
SODIUM SERPL-SCNC: 143 MMOL/L (ref 136–145)
WBC NRBC COR # BLD AUTO: 10.05 10*3/MM3 (ref 3.4–10.8)

## 2024-03-31 PROCEDURE — 80048 BASIC METABOLIC PNL TOTAL CA: CPT | Performed by: FAMILY MEDICINE

## 2024-03-31 PROCEDURE — 97166 OT EVAL MOD COMPLEX 45 MIN: CPT

## 2024-03-31 PROCEDURE — 94664 DEMO&/EVAL PT USE INHALER: CPT

## 2024-03-31 PROCEDURE — 25010000002 CEFTRIAXONE PER 250 MG: Performed by: FAMILY MEDICINE

## 2024-03-31 PROCEDURE — 99232 SBSQ HOSP IP/OBS MODERATE 35: CPT | Performed by: INTERNAL MEDICINE

## 2024-03-31 PROCEDURE — 85025 COMPLETE CBC W/AUTO DIFF WBC: CPT | Performed by: FAMILY MEDICINE

## 2024-03-31 PROCEDURE — 83880 ASSAY OF NATRIURETIC PEPTIDE: CPT | Performed by: INTERNAL MEDICINE

## 2024-03-31 PROCEDURE — 94660 CPAP INITIATION&MGMT: CPT

## 2024-03-31 PROCEDURE — 83735 ASSAY OF MAGNESIUM: CPT | Performed by: FAMILY MEDICINE

## 2024-03-31 PROCEDURE — 94761 N-INVAS EAR/PLS OXIMETRY MLT: CPT

## 2024-03-31 PROCEDURE — 25010000002 FUROSEMIDE PER 20 MG: Performed by: INTERNAL MEDICINE

## 2024-03-31 PROCEDURE — 71045 X-RAY EXAM CHEST 1 VIEW: CPT

## 2024-03-31 PROCEDURE — 94799 UNLISTED PULMONARY SVC/PX: CPT

## 2024-03-31 PROCEDURE — 25010000002 METHYLPREDNISOLONE PER 125 MG: Performed by: FAMILY MEDICINE

## 2024-03-31 PROCEDURE — 63710000001 DIPHENHYDRAMINE PER 50 MG: Performed by: INTERNAL MEDICINE

## 2024-03-31 RX ORDER — IPRATROPIUM BROMIDE AND ALBUTEROL SULFATE 2.5; .5 MG/3ML; MG/3ML
3 SOLUTION RESPIRATORY (INHALATION)
Status: DISCONTINUED | OUTPATIENT
Start: 2024-03-31 | End: 2024-03-31

## 2024-03-31 RX ORDER — BUDESONIDE 0.5 MG/2ML
1 INHALANT ORAL
Status: DISCONTINUED | OUTPATIENT
Start: 2024-03-31 | End: 2024-04-03 | Stop reason: HOSPADM

## 2024-03-31 RX ORDER — DIPHENHYDRAMINE HYDROCHLORIDE 50 MG/ML
25 INJECTION INTRAMUSCULAR; INTRAVENOUS EVERY 6 HOURS PRN
Status: DISCONTINUED | OUTPATIENT
Start: 2024-03-31 | End: 2024-04-03 | Stop reason: HOSPADM

## 2024-03-31 RX ORDER — IPRATROPIUM BROMIDE AND ALBUTEROL SULFATE 2.5; .5 MG/3ML; MG/3ML
3 SOLUTION RESPIRATORY (INHALATION) EVERY 4 HOURS PRN
Status: DISCONTINUED | OUTPATIENT
Start: 2024-03-31 | End: 2024-04-03 | Stop reason: HOSPADM

## 2024-03-31 RX ORDER — GUAIFENESIN 600 MG/1
600 TABLET, EXTENDED RELEASE ORAL EVERY 12 HOURS SCHEDULED
Status: DISCONTINUED | OUTPATIENT
Start: 2024-03-31 | End: 2024-04-03 | Stop reason: HOSPADM

## 2024-03-31 RX ORDER — PANTOPRAZOLE SODIUM 40 MG/10ML
40 INJECTION, POWDER, LYOPHILIZED, FOR SOLUTION INTRAVENOUS
Status: DISCONTINUED | OUTPATIENT
Start: 2024-03-31 | End: 2024-03-31

## 2024-03-31 RX ORDER — DIPHENHYDRAMINE HCL 25 MG
25 CAPSULE ORAL ONCE
Status: COMPLETED | OUTPATIENT
Start: 2024-03-31 | End: 2024-03-31

## 2024-03-31 RX ORDER — TRAZODONE HYDROCHLORIDE 100 MG/1
100 TABLET ORAL EVERY 12 HOURS PRN
Status: DISCONTINUED | OUTPATIENT
Start: 2024-03-31 | End: 2024-04-03 | Stop reason: HOSPADM

## 2024-03-31 RX ORDER — CALCIUM CARBONATE 500 MG/1
2 TABLET, CHEWABLE ORAL 3 TIMES DAILY PRN
Status: DISCONTINUED | OUTPATIENT
Start: 2024-03-31 | End: 2024-04-03 | Stop reason: HOSPADM

## 2024-03-31 RX ORDER — ROFLUMILAST 500 UG/1
250 TABLET ORAL DAILY
Status: DISCONTINUED | OUTPATIENT
Start: 2024-03-31 | End: 2024-04-03 | Stop reason: HOSPADM

## 2024-03-31 RX ORDER — FUROSEMIDE 10 MG/ML
20 INJECTION INTRAMUSCULAR; INTRAVENOUS EVERY 12 HOURS
Status: DISCONTINUED | OUTPATIENT
Start: 2024-03-31 | End: 2024-04-02

## 2024-03-31 RX ORDER — PANTOPRAZOLE SODIUM 40 MG/1
40 TABLET, DELAYED RELEASE ORAL
Status: DISCONTINUED | OUTPATIENT
Start: 2024-03-31 | End: 2024-04-03 | Stop reason: HOSPADM

## 2024-03-31 RX ORDER — METHYLPREDNISOLONE SODIUM SUCCINATE 40 MG/ML
40 INJECTION, POWDER, LYOPHILIZED, FOR SOLUTION INTRAMUSCULAR; INTRAVENOUS EVERY 24 HOURS
Status: DISCONTINUED | OUTPATIENT
Start: 2024-04-01 | End: 2024-04-03

## 2024-03-31 RX ORDER — ALPRAZOLAM 1 MG/1
1 TABLET ORAL ONCE AS NEEDED
Status: COMPLETED | OUTPATIENT
Start: 2024-03-31 | End: 2024-03-31

## 2024-03-31 RX ADMIN — IPRATROPIUM BROMIDE AND ALBUTEROL SULFATE 3 ML: .5; 3 SOLUTION RESPIRATORY (INHALATION) at 07:27

## 2024-03-31 RX ADMIN — METHYLPREDNISOLONE SODIUM SUCCINATE 60 MG: 125 INJECTION, POWDER, FOR SOLUTION INTRAMUSCULAR; INTRAVENOUS at 08:19

## 2024-03-31 RX ADMIN — FUROSEMIDE 20 MG: 10 INJECTION, SOLUTION INTRAMUSCULAR; INTRAVENOUS at 09:31

## 2024-03-31 RX ADMIN — GUAIFENESIN 600 MG: 600 TABLET, EXTENDED RELEASE ORAL at 14:00

## 2024-03-31 RX ADMIN — CEFTRIAXONE 2000 MG: 2 INJECTION, POWDER, FOR SOLUTION INTRAMUSCULAR; INTRAVENOUS at 17:21

## 2024-03-31 RX ADMIN — IPRATROPIUM BROMIDE 0.5 MG: 0.5 SOLUTION RESPIRATORY (INHALATION) at 12:15

## 2024-03-31 RX ADMIN — BUDESONIDE 0.5 MG: 0.5 INHALANT RESPIRATORY (INHALATION) at 07:27

## 2024-03-31 RX ADMIN — ALPRAZOLAM 1 MG: 1 TABLET ORAL at 20:42

## 2024-03-31 RX ADMIN — IPRATROPIUM BROMIDE 0.5 MG: 0.5 SOLUTION RESPIRATORY (INHALATION) at 20:25

## 2024-03-31 RX ADMIN — ALPRAZOLAM 1 MG: 1 TABLET ORAL at 22:47

## 2024-03-31 RX ADMIN — PANTOPRAZOLE SODIUM 40 MG: 40 TABLET, DELAYED RELEASE ORAL at 09:32

## 2024-03-31 RX ADMIN — Medication 10 ML: at 20:42

## 2024-03-31 RX ADMIN — ACETYLCYSTEINE 3 ML: 200 SOLUTION ORAL; RESPIRATORY (INHALATION) at 07:27

## 2024-03-31 RX ADMIN — APIXABAN 5 MG: 5 TABLET, FILM COATED ORAL at 20:42

## 2024-03-31 RX ADMIN — DIPHENHYDRAMINE HYDROCHLORIDE 25 MG: 25 CAPSULE ORAL at 08:19

## 2024-03-31 RX ADMIN — IPRATROPIUM BROMIDE 0.5 MG: 0.5 SOLUTION RESPIRATORY (INHALATION) at 14:48

## 2024-03-31 RX ADMIN — FUROSEMIDE 20 MG: 10 INJECTION, SOLUTION INTRAMUSCULAR; INTRAVENOUS at 20:41

## 2024-03-31 RX ADMIN — CALCIUM CARBONATE 2 TABLET: 500 TABLET, CHEWABLE ORAL at 14:00

## 2024-03-31 RX ADMIN — BUDESONIDE 1 MG: 0.5 INHALANT RESPIRATORY (INHALATION) at 20:30

## 2024-03-31 RX ADMIN — APIXABAN 5 MG: 5 TABLET, FILM COATED ORAL at 09:32

## 2024-03-31 RX ADMIN — ROFLUMILAST 250 MCG: 500 TABLET ORAL at 14:00

## 2024-03-31 RX ADMIN — Medication 10 ML: at 08:24

## 2024-03-31 RX ADMIN — METHYLPREDNISOLONE SODIUM SUCCINATE 60 MG: 125 INJECTION, POWDER, FOR SOLUTION INTRAMUSCULAR; INTRAVENOUS at 00:23

## 2024-03-31 NOTE — NURSING NOTE
Patient O2 dropped to 60s last night at beginning of shift. Patient placed on bipap. Patient had same reaction this morning after receiving mucomyst. Patient O2 dropped to 60s and was placed on bipap. Mucomyst was given prior to his O2 dropping last night as well. This nurse reached out to hospitalist. Mucomyst discontinued and benadryl ordered.

## 2024-03-31 NOTE — PLAN OF CARE
Goal Outcome Evaluation:      Patient O2 dropped to 60s last night at beginning of shift. Patient placed on bipap. Patient had same reaction this morning after receiving mucomyst. Patient O2 dropped to 60s and was placed on bipap. Mucomyst was given prior to his O2 dropping last night as well. This nurse reached out to hospitalist. Mucomyst discontinued and benadryl ordered.      Patient has done well ambulating between chair and bed. Patient had complaints of indigestion after his meal, patient reqeusted Tums. Reached out to hospitalist and orders were given. Plan of care ongoing.

## 2024-03-31 NOTE — PROGRESS NOTES
[facility] MEDICINE SERVICE  DAILY PROGRESS NOTE    NAME: Orlando Velasquez  : 1950  MRN: [id number]      LOS: 2 days     PROVIDER OF SERVICE: Roby Stacy MD    Chief Complaint: Respiratory failure    Subjective:     Interval History:  History taken from: patient    Subjective       Chief Complaint: shortness of air     History of Present Illness: Orlando Velasquez is a 73 y.o. male with a CMH of hypertension, hyperlipidemia, COPD, chronic respiratory failure presenting from outside facility with difficulty breathing.  He was being treated for pneumonia and COPD exacerbation and acute hypoxic hypercapnic respiratory failure.  He was kept on BiPAP intermittently.  Patient had CT scan done at outside facility which showed possible mucous plugging and he was transferred here for possible pulmonology consult and bronchoscopy.  As of right now patient is on 4 L of supplemental oxygen, at baseline he wears 3 L at home.  Patient also fell down a few days ago and has bruising on his face patient was also diagnosed with rhabdomyolysis and was being treated with IV fluids at outside facility.     3/30/24 patient seen and examined as sitting in recliner, dyspnea on 4 L of oxygen, fatigue and weakness, family at bedside, discussed with RN.    Review of Systems  Respiratory:  Positive for cough and shortness of breath.    Cardiovascular:  Negative for chest pain.   Objective:     Vital Signs  Temp:  [97.7 °F (36.5 °C)-98 °F (36.7 °C)] 97.7 °F (36.5 °C)  Heart Rate:  [] 80  Resp:  [16-24] 20  BP: (133-166)/(64-78) 154/78  Flow (L/min):  [3-4] 4   Body mass index is 34.06 kg/m².    Physical Exam  Constitutional:       General: He is not in acute distress.     Appearance: He is well-developed. He is not diaphoretic.   HENT:      Head: Normocephalic and atraumatic.   Cardiovascular:      Rate and Rhythm: Normal rate.   Pulmonary:      Effort: Pulmonary effort is normal. Respiratory distress present.       Breath sounds: No wheezing.   Abdominal:      General: There is no distension.      Palpations: Abdomen is soft.   Musculoskeletal:         General: Normal range of motion.   Skin:     General: Skin is warm and dry.   Neurological:      Mental Status: He is alert.      Cranial Nerves: No cranial nerve deficit.   Psychiatric:         Behavior: Behavior normal.         Thought Content: Thought content normal.         Judgment: Judgment normal.      Scheduled Meds   apixaban, 5 mg, Oral, BID  budesonide, 1 mg, Nebulization, BID - RT  cefTRIAXone, 2,000 mg, Intravenous, Q24H  furosemide, 20 mg, Intravenous, Q12H  ipratropium, 0.5 mg, Nebulization, 4x Daily - RT  methylPREDNISolone sodium succinate, 60 mg, Intravenous, Q8H  pantoprazole, 40 mg, Oral, Q AM  sodium chloride, 10 mL, Intravenous, Q12H       PRN Meds     ALPRAZolam    calcium carbonate    diphenhydrAMINE    ipratropium-albuterol    sodium chloride    sodium chloride    traZODone   Infusions  sodium chloride, 40 mL/hr, Last Rate: 40 mL/hr (03/29/24 1712)          Diagnostic Data    Results from last 7 days   Lab Units 03/31/24  0343   WBC 10*3/mm3 10.05   HEMOGLOBIN g/dL 10.0*   HEMATOCRIT % 33.7*   PLATELETS 10*3/mm3 247   GLUCOSE mg/dL 121*   CREATININE mg/dL 0.75*   BUN mg/dL 25*   SODIUM mmol/L 143   POTASSIUM mmol/L 4.2   ANION GAP mmol/L 6.0       XR Chest 1 View    Result Date: 3/31/2024  Impression: 1.No acute radiographic abnormality is identified. 2.Pulmonary emphysema. Electronically Signed: Santana Delgado MD  3/31/2024 11:51 AM EDT  Workstation ID: [id number]       I reviewed the patient's new clinical results.    Assessment/Plan:     Active and Resolved Problems  Active Hospital Problems    Diagnosis  POA    **Respiratory failure [J96.90]  Yes      Resolved Hospital Problems   No resolved problems to display.        Acute on chronic hypoxic hypercapnic respiratory failure-   -ABG will be ordered  -Oxygen titration  -Brovana budesonide  DuoNeb  -Mucinex,   - BiPAP as needed,   -pulmonology consult     Pneumonia-IV antibiotics have been started, cultures will be ordered, continue to monitor     Atrial fibrillation-cardiology consult, we will continue to monitor heart rate, Cardizem will be given as needed     Rhabdomyolysis-continue with IV fluids continue monitoring     Hypertension-continue monitoring blood pressure       DVT prophylaxis:-SCD  Medical and mechanical DVT prophylaxis orders are present.         Code status is   Code Status and Medical Interventions:   Ordered at: 03/29/24 1533     Code Status (Patient has no pulse and is not breathing):    CPR (Attempt to Resuscitate)     Medical Interventions (Patient has pulse or is breathing):    Full Support       Plan for disposition:nh in 3 days    Time: 30 minutes    Signature: Electronically signed by Roby Stacy MD, 03/31/24, 13:34 EDT.  [facility] Hospitalist Team

## 2024-03-31 NOTE — PROGRESS NOTES
[facility] MEDICINE SERVICE  DAILY PROGRESS NOTE    NAME: Orlando Velasquez  : 1950  MRN: [id number]      LOS: 2 days     PROVIDER OF SERVICE: Roby Stacy MD    Chief Complaint: Respiratory failure    Subjective:     Interval History:  History taken from: patient    Subjective       Chief Complaint: shortness of air     History of Present Illness: Orlando Velasquez is a 73 y.o. male with a CMH of hypertension, hyperlipidemia, COPD, chronic respiratory failure presenting from outside facility with difficulty breathing.  He was being treated for pneumonia and COPD exacerbation and acute hypoxic hypercapnic respiratory failure.  He was kept on BiPAP intermittently.  Patient had CT scan done at outside facility which showed possible mucous plugging and he was transferred here for possible pulmonology consult and bronchoscopy.  As of right now patient is on 4 L of supplemental oxygen, at baseline he wears 3 L at home.  Patient also fell down a few days ago and has bruising on his face patient was also diagnosed with rhabdomyolysis and was being treated with IV fluids at outside facility.     3/30/24 patient seen and examined as sitting in recliner, dyspnea on 4 L of oxygen, fatigue and weakness, family at bedside, discussed with RN.  3/31/2024 patient sitting in recliner, family at bedside, dyspnea on 4 L, Multiple recurrent episode of respiratory distress after Mucomyst improved within half an hour   Feeling shaking and tremors, less coughing no nausea or vomiting, no change in bowel habit, no dysuria,  no new  skin rash or itching.     Review of Systems  Respiratory:  Positive for cough and shortness of breath.    Cardiovascular:  Negative for chest pain.   Objective:     Vital Signs  Temp:  [97.7 °F (36.5 °C)-98 °F (36.7 °C)] 97.7 °F (36.5 °C)  Heart Rate:  [] 80  Resp:  [16-24] 20  BP: (133-166)/(64-78) 154/78  Flow (L/min):  [3-4] 4   Body mass index is 34.06 kg/m².    Physical  Exam  Constitutional:       General: He is not in acute distress.     Appearance: He is well-developed. He is not diaphoretic.   HENT:      Head: Normocephalic and atraumatic.   Cardiovascular:      Rate and Rhythm: Normal rate.   Pulmonary:      Effort: Pulmonary effort is normal. Respiratory distress present.      Breath sounds: No wheezing.   Abdominal:      General: There is no distension.      Palpations: Abdomen is soft.   Musculoskeletal:         General: Normal range of motion.   Skin:     General: Skin is warm and dry.   Neurological:      Mental Status: He is alert.      Cranial Nerves: No cranial nerve deficit.   Psychiatric:         Behavior: Behavior normal.         Thought Content: Thought content normal.         Judgment: Judgment normal.      Scheduled Meds   apixaban, 5 mg, Oral, BID  budesonide, 1 mg, Nebulization, BID - RT  cefTRIAXone, 2,000 mg, Intravenous, Q24H  furosemide, 20 mg, Intravenous, Q12H  ipratropium, 0.5 mg, Nebulization, 4x Daily - RT  methylPREDNISolone sodium succinate, 60 mg, Intravenous, Q8H  pantoprazole, 40 mg, Oral, Q AM  sodium chloride, 10 mL, Intravenous, Q12H       PRN Meds     ALPRAZolam    calcium carbonate    diphenhydrAMINE    ipratropium-albuterol    sodium chloride    sodium chloride    traZODone   Infusions  sodium chloride, 40 mL/hr, Last Rate: 40 mL/hr (03/29/24 1712)          Diagnostic Data    Results from last 7 days   Lab Units 03/31/24  0343   WBC 10*3/mm3 10.05   HEMOGLOBIN g/dL 10.0*   HEMATOCRIT % 33.7*   PLATELETS 10*3/mm3 247   GLUCOSE mg/dL 121*   CREATININE mg/dL 0.75*   BUN mg/dL 25*   SODIUM mmol/L 143   POTASSIUM mmol/L 4.2   ANION GAP mmol/L 6.0       XR Chest 1 View    Result Date: 3/31/2024  Impression: 1.No acute radiographic abnormality is identified. 2.Pulmonary emphysema. Electronically Signed: Santana Delgado MD  3/31/2024 11:51 AM EDT  Workstation ID: [id number]       I reviewed the patient's new clinical results.    Assessment/Plan:      Active and Resolved Problems  Active Hospital Problems    Diagnosis  POA    **Respiratory failure [J96.90]  Yes      Resolved Hospital Problems   No resolved problems to display.        Acute on chronic hypoxic hypercapnic respiratory failure-   -ABG will be ordered  -Oxygen titration  -Brovana budesonide DuoNeb  -Mucinex,   - BiPAP as needed,   -pulmonology consult     Pneumonia-IV antibiotics have been started, cultures will be ordered, continue to monitor     Atrial fibrillation-cardiology consult, we will continue to monitor heart rate, Cardizem will be given as needed     Rhabdomyolysis-continue with IV fluids continue monitoring     Hypertension-continue monitoring blood pressure       DVT prophylaxis:-SCD  Medical and mechanical DVT prophylaxis orders are present.         Code status is   Code Status and Medical Interventions:   Ordered at: 03/29/24 1533     Code Status (Patient has no pulse and is not breathing):    CPR (Attempt to Resuscitate)     Medical Interventions (Patient has pulse or is breathing):    Full Support       Plan for disposition:nh in 3 days    Time: 30 minutes    Signature: Electronically signed by Roby Stacy MD, 03/31/24, 13:37 EDT.  [facility] Hospitalist Team

## 2024-03-31 NOTE — PROGRESS NOTES
Referring Provider: Roby Stacy MD    Reason for follow-up: Atrial fibrillation     Patient Care Team:  Jagdeep Dialey DO as PCP - General (Internal Medicine)      SUBJECTIVE  Appears short of breath.  Desaturation episode overnight.  Per pulmonology he appears to have intolerance to Mucomyst.     ROS  Review of all systems negative except as indicated.    Since I have last seen, the patient has been without any chest discomfort, shortness of breath, palpitations, dizziness or syncope.  Denies having any headache, abdominal pain, nausea, vomiting, diarrhea, constipation, loss of weight or loss of appetite.  Denies having any excessive bruising, hematuria or blood in the stool.  ROS      Personal History:    Past Medical History:   Diagnosis Date    A-fib     Arthritis     Asthma     Cancer     COPD (chronic obstructive pulmonary disease)     DVT (deep venous thrombosis)     GERD (gastroesophageal reflux disease)     Hernia, abdominal     History of transfusion     PE (pulmonary thromboembolism)     Presence of IVC filter        History reviewed. No pertinent surgical history.    History reviewed. No pertinent family history.    Social History     Tobacco Use    Smoking status: Former     Current packs/day: 0.00     Types: Cigarettes     Quit date:      Years since quittin.2     Passive exposure: Past    Smokeless tobacco: Never   Vaping Use    Vaping status: Never Used   Substance Use Topics    Alcohol use: Never    Drug use: Never        Home meds:  Prior to Admission medications    Medication Sig Start Date End Date Taking? Authorizing Provider   ALPRAZolam (XANAX) 1 MG tablet Take 1 tablet by mouth 3 (Three) Times a Day As Needed for Anxiety.    Leonora Davis MD   apixaban (ELIQUIS) 5 MG tablet tablet Take 1 tablet by mouth 2 (Two) Times a Day.    Leonora Davis MD   furosemide (LASIX) 40 MG tablet Take 1 tablet by mouth Daily.    Leonora Davis MD   omeprazole  "(priLOSEC) 40 MG capsule Take 1 capsule by mouth Daily.    Provider, MD Leonora   predniSONE (DELTASONE) 5 MG tablet Take 1 tablet by mouth Daily.    Provider, MD Leonora   traZODone (DESYREL) 50 MG tablet Take 1 tablet by mouth 3 (Three) Times a Day.    Provider, Leonora, MD       Allergies:  Patient has no known allergies.    Scheduled Meds:acetylcysteine, 3 mL, Nebulization, TID - RT  budesonide, 0.5 mg, Nebulization, BID - RT  cefTRIAXone, 2,000 mg, Intravenous, Q24H  famotidine, 20 mg, Oral, BID AC  ipratropium-albuterol, 3 mL, Nebulization, 4x Daily - RT  methylPREDNISolone sodium succinate, 60 mg, Intravenous, Q8H  sodium chloride, 10 mL, Intravenous, Q12H      Continuous Infusions:sodium chloride, 40 mL/hr, Last Rate: 40 mL/hr (03/29/24 1712)      PRN Meds:.  ALPRAZolam    ipratropium-albuterol    sodium chloride    sodium chloride      OBJECTIVE    Vital Signs  Vitals:    03/30/24 2229 03/31/24 0026 03/31/24 0305 03/31/24 0456   BP:  135/74  166/76   BP Location:  Left arm  Left arm   Patient Position:  Lying  Lying   Pulse: 81 75 79 80   Resp: 18 18  18   Temp:  97.7 °F (36.5 °C)  98 °F (36.7 °C)   TempSrc:  Axillary  Oral   SpO2: 98% 97% 98% 95%   Weight:       Height:           Flowsheet Rows      Flowsheet Row First Filed Value   Admission Height 162.6 cm (64\") Documented at 03/29/2024 1640   Admission Weight 92.3 kg (203 lb 7.8 oz) Documented at 03/29/2024 1500              Intake/Output Summary (Last 24 hours) at 3/31/2024 0708  Last data filed at 3/31/2024 0400  Gross per 24 hour   Intake --   Output 500 ml   Net -500 ml          Telemetry: Sinus rhythm    Physical Exam:  The patient is alert, oriented and in mild respiratory distress  Vital signs as noted above.  Head and neck revealed no carotid bruits or jugular venous distention.  No thyromegaly or lymphadenopathy is present  Lungs clear.  No wheezing.  Breath sounds are normal bilaterally.  Heart normal first and second heart sounds.  " No murmur. No precordial rub is present.  No gallop is present.  Abdomen soft and nontender.  No organomegaly is present.  Extremities with good peripheral pulses without any pedal edema.  Skin warm and dry.  Musculoskeletal system is grossly normal.  CNS grossly normal.       Results Review:  I have personally reviewed the results from the time of this admission to 3/31/2024 07:08 EDT and agree with these findings:  []  Laboratory  []  Microbiology  []  Radiology  []  EKG/Telemetry   []  Cardiology/Vascular   []  Pathology  []  Old records  []  Other:    Most notable findings include:    Lab Results (last 24 hours)       Procedure Component Value Units Date/Time    Basic Metabolic Panel [[id number]]  (Abnormal) Collected: 03/31/24 0343    Specimen: Blood Updated: 03/31/24 0409     Glucose 121 mg/dL      BUN 25 mg/dL      Creatinine 0.75 mg/dL      Sodium 143 mmol/L      Potassium 4.2 mmol/L      Chloride 106 mmol/L      CO2 31.0 mmol/L      Calcium 8.3 mg/dL      BUN/Creatinine Ratio 33.3     Anion Gap 6.0 mmol/L      eGFR 95.9 mL/min/1.73     Narrative:      GFR Normal >60  Chronic Kidney Disease <60  Kidney Failure <15    The GFR formula is only valid for adults with stable renal function between ages 18 and 70.    Magnesium [[id number]]  (Normal) Collected: 03/31/24 0343    Specimen: Blood Updated: 03/31/24 0409     Magnesium 1.9 mg/dL     CBC & Differential [[id number]]  (Abnormal) Collected: 03/31/24 0343    Specimen: Blood Updated: 03/31/24 0349    Narrative:      The following orders were created for panel order CBC & Differential.  Procedure                               Abnormality         Status                     ---------                               -----------         ------                     CBC Auto Differential[[id number]]        Abnormal            Final result                 Please view results for these tests on the individual orders.    CBC Auto Differential [[id number]]  (Abnormal) Collected:  03/31/24 0343    Specimen: Blood Updated: 03/31/24 0349     WBC 10.05 10*3/mm3      RBC 3.76 10*6/mm3      Hemoglobin 10.0 g/dL      Hematocrit 33.7 %      MCV 89.6 fL      MCH 26.6 pg      MCHC 29.7 g/dL      RDW 15.1 %      RDW-SD 49.1 fl      MPV 9.6 fL      Platelets 247 10*3/mm3      Neutrophil % 91.3 %      Lymphocyte % 3.1 %      Monocyte % 2.9 %      Eosinophil % 0.0 %      Basophil % 0.2 %      Immature Grans % 2.5 %      Neutrophils, Absolute 9.18 10*3/mm3      Lymphocytes, Absolute 0.31 10*3/mm3      Monocytes, Absolute 0.29 10*3/mm3      Eosinophils, Absolute 0.00 10*3/mm3      Basophils, Absolute 0.02 10*3/mm3      Immature Grans, Absolute 0.25 10*3/mm3      nRBC 0.0 /100 WBC     POC Glucose Once [[id number]]  (Abnormal) Collected: 03/30/24 1929    Specimen: Blood Updated: 03/30/24 1930     Glucose 112 mg/dL      Comment: Serial Number: [id number]:  [id number]       Magnesium [[id number]]  (Normal) Collected: 03/30/24 1251    Specimen: Blood Updated: 03/30/24 1331     Magnesium 2.1 mg/dL     Basic Metabolic Panel [[id number]]  (Abnormal) Collected: 03/30/24 1251    Specimen: Blood Updated: 03/30/24 1331     Glucose 137 mg/dL      BUN 23 mg/dL      Creatinine 0.85 mg/dL      Sodium 144 mmol/L      Potassium 4.2 mmol/L      Chloride 102 mmol/L      CO2 33.0 mmol/L      Calcium 8.9 mg/dL      BUN/Creatinine Ratio 27.1     Anion Gap 9.0 mmol/L      eGFR 92.3 mL/min/1.73     Narrative:      GFR Normal >60  Chronic Kidney Disease <60  Kidney Failure <15    The GFR formula is only valid for adults with stable renal function between ages 18 and 70.    Blood Culture - Blood, Hand, Left [[id number]] Collected: 03/30/24 1250    Specimen: Blood from Hand, Left Updated: 03/30/24 1258    Blood Culture - Blood, Arm, Right [[id number]] Collected: 03/30/24 1251    Specimen: Blood from Arm, Right Updated: 03/30/24 1258    Lipid Panel [[id number]]  (Abnormal) Collected: 03/30/24 0450    Specimen: Blood Updated: 03/30/24  0948     Total Cholesterol 135 mg/dL      Triglycerides 113 mg/dL      HDL Cholesterol 34 mg/dL      LDL Cholesterol  80 mg/dL      VLDL Cholesterol 21 mg/dL      LDL/HDL Ratio 2.31    Narrative:      Cholesterol Reference Ranges  (U.S. Department of Health and Human Services ATP III Classifications)    Desirable          <200 mg/dL  Borderline High    200-239 mg/dL  High Risk          >240 mg/dL      Triglyceride Reference Ranges  (U.S. Department of Health and Human Services ATP III Classifications)    Normal           <150 mg/dL  Borderline High  150-199 mg/dL  High             200-499 mg/dL  Very High        >500 mg/dL    HDL Reference Ranges  (U.S. Department of Health and Human Services ATP III Classifications)    Low     <40 mg/dl (major risk factor for CHD)  High    >60 mg/dl ('negative' risk factor for CHD)        LDL Reference Ranges  (U.S. Department of Health and Human Services ATP III Classifications)    Optimal          <100 mg/dL  Near Optimal     100-129 mg/dL  Borderline High  130-159 mg/dL  High             160-189 mg/dL  Very High        >189 mg/dL            Imaging Results (Last 24 Hours)       ** No results found for the last 24 hours. **            LAB RESULTS (LAST 7 DAYS)    CBC  Results from last 7 days   Lab Units 03/31/24  0343 03/30/24  0450   WBC 10*3/mm3 10.05 8.51   RBC 10*6/mm3 3.76* 3.96*   HEMOGLOBIN g/dL 10.0* 10.5*   HEMATOCRIT % 33.7* 36.0*   MCV fL 89.6 90.9   PLATELETS 10*3/mm3 247 241       BMP  Results from last 7 days   Lab Units 03/31/24  0343 03/30/24  1251 03/30/24  0450   SODIUM mmol/L 143 144 141   POTASSIUM mmol/L 4.2 4.2 4.5   CHLORIDE mmol/L 106 102 103   CO2 mmol/L 31.0* 33.0* 32.0*   BUN mg/dL 25* 23 19   CREATININE mg/dL 0.75* 0.85 0.75*   GLUCOSE mg/dL 121* 137* 116*   MAGNESIUM mg/dL 1.9 2.1 2.0       CMP   Results from last 7 days   Lab Units 03/31/24  0343 03/30/24  1251 03/30/24  0450   SODIUM mmol/L 143 144 141   POTASSIUM mmol/L 4.2 4.2 4.5   CHLORIDE  mmol/L 106 102 103   CO2 mmol/L 31.0* 33.0* 32.0*   BUN mg/dL 25* 23 19   CREATININE mg/dL 0.75* 0.85 0.75*   GLUCOSE mg/dL 121* 137* 116*       BNP        TROPONIN        CoAg        Creatinine Clearance  Estimated Creatinine Clearance: 90 mL/min (A) (by C-G formula based on SCr of 0.75 mg/dL (L)).    ABG  Results from last 7 days   Lab Units 03/29/24  1606   PH, ARTERIAL pH units 7.371   PCO2, ARTERIAL mm Hg 66.4*   PO2 ART mm Hg 110.1*   O2 SATURATION ART % 97.9   BASE EXCESS ART mmol/L 10.8*       Radiology  No radiology results for the last day      EKG  I personally viewed and interpreted the patient's EKG/Telemetry data:  ECG 12 Lead Rhythm Change   Final Result   HEART RATE= 72  bpm   RR Interval= 836  ms   NE Interval= 153  ms   P Horizontal Axis= 28  deg   P Front Axis= 40  deg   QRSD Interval= 90  ms   QT Interval= 353  ms   QTcB= 386  ms   QRS Axis= -39  deg   T Wave Axis= 64  deg   - OTHERWISE NORMAL ECG -   Sinus rhythm   Left axis deviation   No previous ECG available for comparison   Electronically Signed By: Clinton Fisher (DEO) 30-Mar-2024 21:01:59   Date and Time of Study: 2024-03-29 17:35:11            Echocardiogram:          Stress Test:         Cardiac Catheterization:  No results found for this or any previous visit.         Other:         ASSESSMENT & PLAN:    Principal Problem:    Respiratory failure    Atrial fibrillation  [id number] score is 4  Anticoagulation on hold due to possible bronchoscopy  Currently sinus rhythm  Cardizem has been stopped  Avoiding beta-blocker due to severe COPD.  RVR appears to be precipitated by respiratory distress.    Continue telemetry  Amiodarone can be used if develops A-fib RVR again    Elevated proBNP  Appears euvolemic  Updated echocardiogram  Recently received IV fluids due to rhabdomyolysis    Rhabdomyolysis  Renal function has normalized.  IV fluids can be stopped     Hyperlipidemia  Start high intensity statin  LDL 80, HDL 34, triglyceride 113 and  total cholesterol 135     COPD/pneumonia  Acute hypoxemic hypercapnic respiratory failure.  Currently on antibiotics, bronchodilators and steroids  Supplemental oxygen as needed  Discontinue Mucomyst due to possible reaction  Possible bronchoscopy if respiratory status does not improve     History of pulmonary embolism in 2018  History of IVC filter which was removed in 2019     Obesity  BMI of 35  Obesity complicates all aspects of his care  Lifestyle modifications recommended to the patient      Clinton Fisher MD  03/31/24  07:08 EDT

## 2024-03-31 NOTE — THERAPY EVALUATION
Patient Name: Orlando Velasquez  : 1950    MRN: [id number]                              Today's Date: 3/31/2024       Admit Date: 3/29/2024    Visit Dx: No diagnosis found.  Patient Active Problem List   Diagnosis    Respiratory failure     Past Medical History:   Diagnosis Date    A-fib     Arthritis     Asthma     Cancer     COPD (chronic obstructive pulmonary disease)     DVT (deep venous thrombosis)     GERD (gastroesophageal reflux disease)     Hernia, abdominal     History of transfusion     PE (pulmonary thromboembolism)     Presence of IVC filter      History reviewed. No pertinent surgical history.   General Information       Row Name 24 0958          OT Time and Intention    Document Type evaluation  -LS     Mode of Treatment occupational therapy  -LS       Row Name 24 09          General Information    Patient Profile Reviewed yes  -LS     Prior Level of Function independent:;all household mobility;min assist:;bathing;dressing  -LS     Existing Precautions/Restrictions fall;oxygen therapy device and L/min  -LS       Row Name 24 09          Living Environment    People in Home spouse  -LS       Row Name 24 09          Home Main Entrance    Number of Stairs, Main Entrance none  -LS       Row Name 24 09          Stairs Within Home, Primary    Number of Stairs, Within Home, Primary none  -LS       Row Name 24 09          Cognition    Orientation Status (Cognition) oriented x 4  -LS       Row Name 24 09          Safety Issues, Functional Mobility    Impairments Affecting Function (Mobility) shortness of breath;strength;endurance/activity tolerance;balance;coordination  -LS               User Key  (r) = Recorded By, (t) = Taken By, (c) = Cosigned By      Initials Name Provider Type    LS Missael Wolfe OT Occupational Therapist                     Mobility/ADL's       Row Name 24 0958          Bed Mobility    Bed Mobility supine-sit  -LS      Supine-Sit [place] (Bed Mobility) standby assist  -LS       Row Name 03/31/24 0958          Transfers    Transfers sit-stand transfer;bed-chair transfer  -LS       Row Name 03/31/24 0958          Bed-Chair Transfer    Bed-Chair [place] (Transfers) minimum assist (75% patient effort)  -LS     Assistive Device (Bed-Chair Transfers) walker, front-wheeled  -LS       Row Name 03/31/24 0958          Sit-Stand Transfer    Sit-Stand [place] (Transfers) minimum assist (75% patient effort)  -LS     Assistive Device (Sit-Stand Transfers) walker, front-wheeled  -LS       Row Name 03/31/24 0958          Functional Mobility    Functional Mobility- Ind. Level minimum assist (75% patient effort)  -LS     Functional Mobility- Device walker, front-wheeled  -LS     Patient was able to Ambulate yes  -LS       Row Name 03/31/24 0958          Activities of Daily Living    BADL Assessment/Intervention toileting;lower body dressing  -LS       Row Name 03/31/24 0958          Toileting Assessment/Training    [place] Level (Toileting) minimum assist (75% patient effort)  -LS     Assistive Devices (Toileting) urinal  -LS     Position (Toileting) supported standing  -LS       Row Name 03/31/24 0958          Lower Body Dressing Assessment/Training    [place] Level (Lower Body Dressing) minimum assist (75% patient effort)  -LS     Position (Lower Body Dressing) edge of bed sitting  -LS               User Key  (r) = Recorded By, (t) = Taken By, (c) = Cosigned By      Initials Name Provider Type    Missael Pérez OT Occupational Therapist                   Obj/Interventions       Row Name 03/31/24 0959          Sensory Assessment (Somatosensory)    Sensory Assessment (Somatosensory) sensation intact  -LS       Row Name 03/31/24 0959          Range of Motion Comprehensive    General Range of Motion bilateral upper extremity ROM WFL  -LS       Row Name 03/31/24 0959          Strength Comprehensive (MMT)    Comment, General  Manual Muscle Testing (MMT) Assessment [place] last 3+/5  -LS       Row Name 03/31/24 0959          Balance    Balance Assessment sitting static balance;sitting dynamic balance;standing static balance;standing dynamic balance  -LS     Static Sitting Balance independent  -LS     Dynamic Sitting Balance independent  -LS     Position, Sitting Balance sitting edge of bed  -LS     Static Standing Balance contact guard  -LS     Dynamic Standing Balance minimal assist  -LS     Position/Device Used, Standing Balance walker, front-wheeled  -LS               User Key  (r) = Recorded By, (t) = Taken By, (c) = Cosigned By      Initials Name Provider Type    Missael Pérez OT Occupational Therapist                   Goals/Plan       Row Name 03/31/24 1006          Bed Mobility Goal 1 (OT)    Activity/Assistive Device (Bed Mobility Goal 1, OT) bed mobility activities, all  -LS     [place] Level/Cues Needed (Bed Mobility Goal 1, OT) modified independence  -LS     Time Frame (Bed Mobility Goal 1, OT) long term goal (LTG);2 weeks  -       Row Name 03/31/24 1006          Transfer Goal 1 (OT)    Activity/Assistive Device (Transfer Goal 1, OT) transfers, all  -LS     [place] Level/Cues Needed (Transfer Goal 1, OT) modified independence  -LS     Time Frame (Transfer Goal 1, OT) long term goal (LTG);2 weeks  -       Row Name 03/31/24 1006          Dressing Goal 1 (OT)    Activity/Device (Dressing Goal 1, OT) dressing skills, all  -LS     [place]/Cues Needed (Dressing Goal 1, OT) modified independence  -LS     Time Frame (Dressing Goal 1, OT) long term goal (LTG);2 weeks  -       Row Name 03/31/24 1006          Toileting Goal 1 (OT)    Activity/Device (Toileting Goal 1, OT) toileting skills, all  -LS     [place] Level/Cues Needed (Toileting Goal 1, OT) modified independence  -LS     Time Frame (Toileting Goal 1, OT) long term goal (LTG);2 weeks  -       Row Name 03/31/24 1006          Therapy Assessment/Plan  (OT)    Planned Therapy Interventions (OT) activity tolerance training;BADL retraining;functional balance retraining;IADL retraining;occupation/activity based interventions;ROM/therapeutic exercise;strengthening exercise;transfer/mobility retraining;patient/caregiver education/training  -               User Key  (r) = Recorded By, (t) = Taken By, (c) = Cosigned By      Initials Name Provider Type    Missael Pérez OT Occupational Therapist                   Clinical Impression       Row Name 03/31/24 1000          Pain Assessment    Pretreatment Pain Rating 0/10 - no pain  -LS     Posttreatment Pain Rating 0/10 - no pain  -LS       Row Name 03/31/24 1000          Plan of Care Review    Plan of Care Reviewed With patient;spouse  -     Outcome Evaluation Pt is a 72 YO M admitted with resp failure from home. Pt reports living at home with spouse, with ramp to enter. Pt states he generally does well with with ADLs but requires min assist at times, ambulation with no AD but a rollator recently and using w/c for community mobility. This date, A&Ox4. He and his wife report he had a reaction to med overnight during which O2 dropped into 60s and he required bipap. He also developed tremors in BUEs. At time of eval, he has recovered with exception of residual tremors. Now back on supplemental O2 via nasal cannula SBA to come to EOB, and Min A to perform lower body dressing and to stand. Due to tremors, Min A needed with mnipulation of urinal. Pt fatigued from overnight events and only tolerated short bout of ambulation to chair with RW after using urinal. HE is well below baseline level, OT recommends SNF at [place] and will follow.  -       Row Name 03/31/24 1000          Therapy Assessment/Plan (OT)    Rehab Potential (OT) good, to achieve stated therapy goals  -LS     Criteria for Skilled Therapeutic Interventions Met (OT) yes;skilled treatment is necessary  -LS     Therapy Frequency (OT) 3 times/wk  -LS     Predicted  Duration of Therapy Intervention (OT) until dc  -LS       Row Name 03/31/24 1000          Therapy Plan Review/Discharge Plan (OT)    Anticipated Discharge Disposition (OT) skilled nursing [facility]  -       Row Name 03/31/24 1000          Vital Signs    Pre SpO2 (%) 96  4L  -LS     O2 Delivery Pre Treatment nasal cannula  -LS     Intra SpO2 (%) 92  -LS     Post SpO2 (%) 94  -LS     Pre Patient Position Supine  -LS     Intra Patient Position Standing  -LS     Post Patient Position Sitting  -       Row Name 03/31/24 1000          Positioning and Restraints    Post Treatment Position chair  -LS     In Chair notified nsg;reclined;exit alarm on;encouraged to call for assist;call light within reach;with family/caregiver  -LS               User Key  (r) = Recorded By, (t) = Taken By, (c) = Cosigned By      Initials Name Provider Type    Missael Pérez OT Occupational Therapist                   Outcome Measures       Row Name 03/31/24 1006          How much help from another is currently needed...    Putting on and taking off regular lower body clothing? 2  -LS     Bathing (including washing, rinsing, and drying) 2  -LS     Toileting (which includes using toilet bed pan or urinal) 2  -LS     Putting on and taking off regular upper body clothing 3  -LS     Taking care of personal grooming (such as brushing teeth) 4  -LS     Eating meals 4  -LS     AM-PAC 6 Clicks Score (OT) 17  -LS       Row Name 03/31/24 1006          Functional Assessment    Outcome Measure Options AM-PAC 6 Clicks Daily Activity (OT)  -LS               User Key  (r) = Recorded By, (t) = Taken By, (c) = Cosigned By      Initials Name Provider Type    Missael Pérez OT Occupational Therapist                    Occupational Therapy Education       Title: PT OT SLP Therapies (Done)       Topic: Occupational Therapy (Done)       Point: ADL training (Done)       Description:   Instruct learner(s) on proper safety adaptation and remediation techniques  during self care or transfers.   Instruct in proper use of assistive devices.                  Learning Progress Summary             Patient Acceptance, E,TB, VU by  at 3/31/2024 1006                                         User Key       Initials Effective Dates Name Provider Type Discipline     09/22/22 -  Missael Wolfe OT Occupational Therapist OT                  OT Recommendation and Plan  Planned Therapy Interventions (OT): activity tolerance training, BADL retraining, functional balance retraining, IADL retraining, occupation/activity based interventions, ROM/therapeutic exercise, strengthening exercise, transfer/mobility retraining, patient/caregiver education/training  Therapy Frequency (OT): 3 times/wk  Plan of Care Review  Plan of Care Reviewed With: patient, spouse  Outcome Evaluation: Pt is a 74 YO M admitted with resp failure from home. Pt reports living at home with spouse, with ramp to enter. Pt states he generally does well with with ADLs but requires min assist at times, ambulation with no AD but a rollator recently and using w/c for community mobility. This date, A&Ox4. He and his wife report he had a reaction to med overnight during which O2 dropped into 60s and he required bipap. He also developed tremors in BUEs. At time of eval, he has recovered with exception of residual tremors. Now back on supplemental O2 via nasal cannula SBA to come to EOB, and Min A to perform lower body dressing and to stand. Due to tremors, Min A needed with mnipulation of urinal. Pt fatigued from overnight events and only tolerated short bout of ambulation to chair with RW after using urinal. HE is well below baseline level, OT recommends SNF at [place] and will follow.     Time Calculation:         Time Calculation- OT       Row Name 03/31/24 1006             Time Calculation- OT    OT Start Time 0931  -      OT Stop Time 1000  -      OT Time Calculation (min) 29 min  -      OT Received On 03/31/24  -      OT  - Next Appointment 04/01/24  -LS      OT Goal Re-Cert Due Date 04/14/24  -         Untimed Charges    OT Eval/Re-eval Minutes 29  -LS         Total Minutes    Untimed Charges Total Minutes 29  -LS       Total Minutes 29  -LS                User Key  (r) = Recorded By, (t) = Taken By, (c) = Cosigned By      Initials Name Provider Type    Missael Pérez OT Occupational Therapist                  Therapy Charges for Today       Code Description Service Date Service Provider Modifiers Qty    42623945782 HC OT EVAL MOD COMPLEXITY 4 3/31/2024 Missael Wolfe OT GO 1                 Missael Wolfe OT  3/31/2024

## 2024-03-31 NOTE — PLAN OF CARE
Goal Outcome Evaluation:              Outcome Evaluation: Patients O2 sats dropped to the 60's at the beginning of the shift and a code was called, patient placed on bipap at this time, increasing the patients O2 sats, patient maintained sats in the 90's the remaining of the night. Patient also had increased anxiety and tremors, IV haldol ordered, not relieving symptoms, home po xanax also ordered, not relieving symptoms. Patients anxiety decreased on its own. Tremos remain. Patient wore bipap on and off throughtout the night. Family remained at bedside during the night. Patient got up to chair at 0440 and has ramained up since.

## 2024-03-31 NOTE — PLAN OF CARE
Goal Outcome Evaluation:  Plan of Care Reviewed With: patient, spouse           Outcome Evaluation: Pt is a 72 YO M admitted with resp failure from home. Pt reports living at home with spouse, with ramp to enter. Pt states he generally does well with with ADLs but requires min assist at times, ambulation with no AD but a rollator recently and using w/c for community mobility. This date, A&Ox4. He and his wife report he had a reaction to med overnight during which O2 dropped into 60s and he required bipap. He also developed tremors in BUEs. At time of eval, he has recovered with exception of residual tremors. Now back on supplemental O2 via nasal cannula SBA to come to EOB, and Min A to perform lower body dressing and to stand. Due to tremors, Min A needed with mnipulation of urinal. Pt fatigued from overnight events and only tolerated short bout of ambulation to chair with RW after using urinal. HE is well below baseline level, OT recommends SNF at [place] and will follow.      Anticipated Discharge Disposition (OT): skilled nursing facility

## 2024-03-31 NOTE — PROGRESS NOTES
"PULMONARY CRITICAL CARE PROGRESS  NOTE      PATIENT IDENTIFICATION:  Name: Orlando Velasquez  MRN: [id number]  :  1950     Age: 73 y.o.  Sex: male    DATE OF Note:  3/31/2024   Referring Physician: Tommie Palm MD                  Subjective:    Multiple recurrent episode of respiratory distress after Mucomyst improved within half an hour   Feeling shaking and tremors   less coughing no nausea or vomiting, no change in bowel habit, no dysuria,  no new  skin rash or itching.      Objective:  tMax 24 hrs: Temp (24hrs), Av.9 °F (36.6 °C), Min:97.7 °F (36.5 °C), Max:98 °F (36.7 °C)      Vitals Ranges:   Temp:  [97.7 °F (36.5 °C)-98 °F (36.7 °C)] 97.7 °F (36.5 °C)  Heart Rate:  [] 83  Resp:  [16-24] 24  BP: (133-166)/(64-78) 154/78    Intake and Output Last 3 Shifts:   I/O last 3 completed shifts:  In: -   Out: 850 [Urine:850]    Exam:  /78 (BP Location: Left arm, Patient Position: Lying)   Pulse 83   Temp 97.7 °F (36.5 °C) (Oral)   Resp 24   Ht 162.6 cm (64\")   Wt 90 kg (198 lb 6.6 oz)   SpO2 96%   BMI 34.06 kg/m²     General Appearance:    alert awake not in distress now having a fine tremor  HEENT:  Normocephalic, without obvious abnormality. Conjunctivae/corneas clear.  Normal external ear canals. Nares normal, no drainage     Neck:  Supple, symmetrical, trachea midline. No JVD.  Lungs /Chest wall:   Bilateral basal rhonchi, respirations unlabored, symmetrical wall movement.     Heart:  Regular rate and rhythm, systolic murmur PMI left sternal border  Abdomen: Soft, nontender, no masses, no organomegaly.    Extremities: Trace edema, no clubbing or cyanosis        Medications:  apixaban, 5 mg, Oral, BID  budesonide, 1 mg, Nebulization, BID - RT  cefTRIAXone, 2,000 mg, Intravenous, Q24H  furosemide, 20 mg, Intravenous, Q12H  ipratropium, 0.5 mg, Nebulization, 4x Daily - RT  methylPREDNISolone sodium succinate, 60 mg, Intravenous, Q8H  pantoprazole, 40 mg, Oral, Q AM  sodium " chloride, 10 mL, Intravenous, Q12H        Infusion:  sodium chloride, 40 mL/hr, Last Rate: 40 mL/hr (03/29/24 4696)         PRN:    ALPRAZolam    diphenhydrAMINE    ipratropium-albuterol    sodium chloride    sodium chloride    traZODone  Data Review:  All labs (24hrs):   Recent Results (from the past 24 hour(s))   Magnesium    Collection Time: 03/30/24 12:51 PM    Specimen: Blood   Result Value Ref Range    Magnesium 2.1 1.6 - 2.4 mg/dL   Basic Metabolic Panel    Collection Time: 03/30/24 12:51 PM    Specimen: Blood   Result Value Ref Range    Glucose 137 (H) 65 - 99 mg/dL    BUN 23 8 - 23 mg/dL    Creatinine 0.85 0.76 - 1.27 mg/dL    Sodium 144 136 - 145 mmol/L    Potassium 4.2 3.5 - 5.2 mmol/L    Chloride 102 98 - 107 mmol/L    CO2 33.0 (H) 22.0 - 29.0 mmol/L    Calcium 8.9 8.6 - 10.5 mg/dL    BUN/Creatinine Ratio 27.1 (H) 7.0 - 25.0    Anion Gap 9.0 5.0 - 15.0 mmol/L    eGFR 92.3 >60.0 mL/min/1.73   POC Glucose Once    Collection Time: 03/30/24  7:29 PM    Specimen: Blood   Result Value Ref Range    Glucose 112 (H) 70 - 105 mg/dL   Basic Metabolic Panel    Collection Time: 03/31/24  3:43 AM    Specimen: Blood   Result Value Ref Range    Glucose 121 (H) 65 - 99 mg/dL    BUN 25 (H) 8 - 23 mg/dL    Creatinine 0.75 (L) 0.76 - 1.27 mg/dL    Sodium 143 136 - 145 mmol/L    Potassium 4.2 3.5 - 5.2 mmol/L    Chloride 106 98 - 107 mmol/L    CO2 31.0 (H) 22.0 - 29.0 mmol/L    Calcium 8.3 (L) 8.6 - 10.5 mg/dL    BUN/Creatinine Ratio 33.3 (H) 7.0 - 25.0    Anion Gap 6.0 5.0 - 15.0 mmol/L    eGFR 95.9 >60.0 mL/min/1.73   Magnesium    Collection Time: 03/31/24  3:43 AM    Specimen: Blood   Result Value Ref Range    Magnesium 1.9 1.6 - 2.4 mg/dL   CBC Auto Differential    Collection Time: 03/31/24  3:43 AM    Specimen: Blood   Result Value Ref Range    WBC 10.05 3.40 - 10.80 10*3/mm3    RBC 3.76 (L) 4.14 - 5.80 10*6/mm3    Hemoglobin 10.0 (L) 13.0 - 17.7 g/dL    Hematocrit 33.7 (L) 37.5 - 51.0 %    MCV 89.6 79.0 - 97.0 fL     MCH 26.6 26.6 - 33.0 pg    MCHC 29.7 (L) 31.5 - 35.7 g/dL    RDW 15.1 12.3 - 15.4 %    RDW-SD 49.1 37.0 - 54.0 fl    MPV 9.6 6.0 - 12.0 fL    Platelets 247 140 - 450 10*3/mm3    Neutrophil % 91.3 (H) 42.7 - 76.0 %    Lymphocyte % 3.1 (L) 19.6 - 45.3 %    Monocyte % 2.9 (L) 5.0 - 12.0 %    Eosinophil % 0.0 (L) 0.3 - 6.2 %    Basophil % 0.2 0.0 - 1.5 %    Immature Grans % 2.5 (H) 0.0 - 0.5 %    Neutrophils, Absolute 9.18 (H) 1.70 - 7.00 10*3/mm3    Lymphocytes, Absolute 0.31 (L) 0.70 - 3.10 10*3/mm3    Monocytes, Absolute 0.29 0.10 - 0.90 10*3/mm3    Eosinophils, Absolute 0.00 0.00 - 0.40 10*3/mm3    Basophils, Absolute 0.02 0.00 - 0.20 10*3/mm3    Immature Grans, Absolute 0.25 (H) 0.00 - 0.05 10*3/mm3    nRBC 0.0 0.0 - 0.2 /100 WBC        Imaging:  XR Chest 1 View  Narrative: XR CHEST 1 VW    Date of Exam: 3/31/2024 11:46 AM EDT    Indication: Shortness of breath    Comparison: Chest x-ray dated 3/26/2024    Findings:  Lungs are hyperinflated without consolidation or mass. No pleural effusion or pneumothorax is identified. The cardiomediastinal silhouette and pulmonary vasculature appear within normal limits. No acute or suspicious osseous lesion is identified.   Impression: Impression:  1.No acute radiographic abnormality is identified.  2.Pulmonary emphysema.    Electronically Signed: Santana Delgado MD    3/31/2024 11:51 AM EDT    Workstation ID: [id number]       ASSESSMENT:  Acute respiratory distress   Pneumonia   COPD   Asthma   Idiopathic scoliosis   A-fib   Hx PE/DVT   GERD   Anemia   RLS   Panic attacks/Anxiety                PLAN:    Will DC the Mucomyst to due to action   Antibiotics   BronchodilatorsWill DC albuterol  because of shaking   Inhaled corticosteroids   IV steroids   Mucomyst   Incentive spirometer   O2 support   Electrolytes/ glycemic control   No DVT and GI prophylaxis             Total Critical care time in direct medical management (   ) minutes. This time specifically excludes time spent  performing procedures.  Amandeep Waters MD. D, ABSM.     3/31/2024  11:57 EDT

## 2024-04-01 ENCOUNTER — APPOINTMENT (OUTPATIENT)
Dept: CARDIOLOGY | Facility: HOSPITAL | Age: 74
End: 2024-04-01
Payer: MEDICARE

## 2024-04-01 ENCOUNTER — APPOINTMENT (OUTPATIENT)
Dept: GENERAL RADIOLOGY | Facility: HOSPITAL | Age: 74
End: 2024-04-01
Payer: MEDICARE

## 2024-04-01 LAB
AORTIC DIMENSIONLESS INDEX: 0.91 (DI)
BH CV ECHO LEFT VENTRICLE GLOBAL LONGITUDINAL STRAIN: -15.3 %
BH CV ECHO MEAS - AO MAX PG: 6.1 MMHG
BH CV ECHO MEAS - AO MEAN PG: 3 MMHG
BH CV ECHO MEAS - AO V2 MAX: 123 CM/SEC
BH CV ECHO MEAS - AO V2 VTI: 22.9 CM
BH CV ECHO MEAS - AVA(I,D): 3.1 CM2
BH CV ECHO MEAS - EDV(CUBED): 79.5 ML
BH CV ECHO MEAS - EDV(MOD-SP4): 85.2 ML
BH CV ECHO MEAS - EF(MOD-BP): 59 %
BH CV ECHO MEAS - EF(MOD-SP4): 58.9 %
BH CV ECHO MEAS - ESV(CUBED): 24.4 ML
BH CV ECHO MEAS - ESV(MOD-SP4): 35 ML
BH CV ECHO MEAS - FS: 32.6 %
BH CV ECHO MEAS - IVS/LVPW: 1 CM
BH CV ECHO MEAS - IVSD: 1.2 CM
BH CV ECHO MEAS - LA DIMENSION: 3.8 CM
BH CV ECHO MEAS - LAT PEAK E' VEL: 7 CM/SEC
BH CV ECHO MEAS - LV DIASTOLIC VOL/BSA (35-75): 43.7 CM2
BH CV ECHO MEAS - LV MASS(C)D: 184.7 GRAMS
BH CV ECHO MEAS - LV MAX PG: 5 MMHG
BH CV ECHO MEAS - LV MEAN PG: 3 MMHG
BH CV ECHO MEAS - LV SYSTOLIC VOL/BSA (12-30): 18 CM2
BH CV ECHO MEAS - LV V1 MAX: 112 CM/SEC
BH CV ECHO MEAS - LV V1 VTI: 22.6 CM
BH CV ECHO MEAS - LVIDD: 4.3 CM
BH CV ECHO MEAS - LVIDS: 2.9 CM
BH CV ECHO MEAS - LVOT AREA: 3.1 CM2
BH CV ECHO MEAS - LVOT DIAM: 2 CM
BH CV ECHO MEAS - LVPWD: 1.2 CM
BH CV ECHO MEAS - MED PEAK E' VEL: 6.2 CM/SEC
BH CV ECHO MEAS - MV A MAX VEL: 72.1 CM/SEC
BH CV ECHO MEAS - MV DEC SLOPE: 383 CM/SEC2
BH CV ECHO MEAS - MV DEC TIME: 0.25 SEC
BH CV ECHO MEAS - MV E MAX VEL: 50.1 CM/SEC
BH CV ECHO MEAS - MV E/A: 0.69
BH CV ECHO MEAS - MV MAX PG: 2.9 MMHG
BH CV ECHO MEAS - MV MEAN PG: 2 MMHG
BH CV ECHO MEAS - MV P1/2T: 69.7 MSEC
BH CV ECHO MEAS - MV V2 VTI: 27.5 CM
BH CV ECHO MEAS - MVA(P1/2T): 3.2 CM2
BH CV ECHO MEAS - MVA(VTI): 2.6 CM2
BH CV ECHO MEAS - PA ACC TIME: 0.1 SEC
BH CV ECHO MEAS - PA V2 MAX: 104 CM/SEC
BH CV ECHO MEAS - RAP SYSTOLE: 8 MMHG
BH CV ECHO MEAS - RV MAX PG: 4.2 MMHG
BH CV ECHO MEAS - RV V1 MAX: 102 CM/SEC
BH CV ECHO MEAS - RV V1 VTI: 18 CM
BH CV ECHO MEAS - RVDD: 3 CM
BH CV ECHO MEAS - RVSP: 24.8 MMHG
BH CV ECHO MEAS - SI(MOD-SP4): 25.8 ML/M2
BH CV ECHO MEAS - SV(LVOT): 71 ML
BH CV ECHO MEAS - SV(MOD-SP4): 50.2 ML
BH CV ECHO MEAS - TAPSE (>1.6): 1.97 CM
BH CV ECHO MEAS - TR MAX PG: 16.8 MMHG
BH CV ECHO MEAS - TR MAX VEL: 205 CM/SEC
BH CV ECHO MEASUREMENTS AVERAGE E/E' RATIO: 7.59
BH CV XLRA - TDI S': 18.6 CM/SEC
GEN 5 2HR TROPONIN T REFLEX: 14 NG/L
LEFT ATRIUM VOLUME INDEX: 23.1 ML/M2
QT INTERVAL: 375 MS
QTC INTERVAL: 410 MS
SINUS: 3.2 CM
TROPONIN T DELTA: -6 NG/L
TROPONIN T SERPL HS-MCNC: 20 NG/L

## 2024-04-01 PROCEDURE — 94664 DEMO&/EVAL PT USE INHALER: CPT

## 2024-04-01 PROCEDURE — 92526 ORAL FUNCTION THERAPY: CPT

## 2024-04-01 PROCEDURE — 94660 CPAP INITIATION&MGMT: CPT

## 2024-04-01 PROCEDURE — 25810000003 SODIUM CHLORIDE 0.9 % SOLUTION: Performed by: FAMILY MEDICINE

## 2024-04-01 PROCEDURE — 99232 SBSQ HOSP IP/OBS MODERATE 35: CPT | Performed by: INTERNAL MEDICINE

## 2024-04-01 PROCEDURE — 93306 TTE W/DOPPLER COMPLETE: CPT

## 2024-04-01 PROCEDURE — 93306 TTE W/DOPPLER COMPLETE: CPT | Performed by: INTERNAL MEDICINE

## 2024-04-01 PROCEDURE — 93010 ELECTROCARDIOGRAM REPORT: CPT | Performed by: INTERNAL MEDICINE

## 2024-04-01 PROCEDURE — 93356 MYOCRD STRAIN IMG SPCKL TRCK: CPT

## 2024-04-01 PROCEDURE — 25010000002 METHYLPREDNISOLONE PER 40 MG: Performed by: INTERNAL MEDICINE

## 2024-04-01 PROCEDURE — 93005 ELECTROCARDIOGRAM TRACING: CPT | Performed by: INTERNAL MEDICINE

## 2024-04-01 PROCEDURE — 71045 X-RAY EXAM CHEST 1 VIEW: CPT

## 2024-04-01 PROCEDURE — 25010000002 CEFTRIAXONE PER 250 MG: Performed by: FAMILY MEDICINE

## 2024-04-01 PROCEDURE — 25010000002 AMIODARONE IN DEXTROSE 5% 360-4.14 MG/200ML-% SOLUTION: Performed by: INTERNAL MEDICINE

## 2024-04-01 PROCEDURE — 94799 UNLISTED PULMONARY SVC/PX: CPT

## 2024-04-01 PROCEDURE — 94761 N-INVAS EAR/PLS OXIMETRY MLT: CPT

## 2024-04-01 PROCEDURE — 93356 MYOCRD STRAIN IMG SPCKL TRCK: CPT | Performed by: INTERNAL MEDICINE

## 2024-04-01 PROCEDURE — 25010000002 FUROSEMIDE PER 20 MG: Performed by: INTERNAL MEDICINE

## 2024-04-01 PROCEDURE — 93005 ELECTROCARDIOGRAM TRACING: CPT | Performed by: NURSE PRACTITIONER

## 2024-04-01 PROCEDURE — 84484 ASSAY OF TROPONIN QUANT: CPT | Performed by: NURSE PRACTITIONER

## 2024-04-01 PROCEDURE — 25010000002 AMIODARONE IN DEXTROSE 5% 150-4.21 MG/100ML-% SOLUTION: Performed by: INTERNAL MEDICINE

## 2024-04-01 RX ORDER — HYDROCODONE BITARTRATE AND ACETAMINOPHEN 5; 325 MG/1; MG/1
1 TABLET ORAL ONCE AS NEEDED
Status: COMPLETED | OUTPATIENT
Start: 2024-04-01 | End: 2024-04-01

## 2024-04-01 RX ORDER — DILTIAZEM HCL/D5W 125 MG/125
5-15 PLASTIC BAG, INJECTION (ML) INTRAVENOUS
Status: DISCONTINUED | OUTPATIENT
Start: 2024-04-01 | End: 2024-04-01 | Stop reason: SDUPTHER

## 2024-04-01 RX ORDER — AMIODARONE HYDROCHLORIDE 200 MG/1
200 TABLET ORAL EVERY 8 HOURS
Qty: 20 TABLET | Refills: 0 | Status: DISCONTINUED | OUTPATIENT
Start: 2024-04-03 | End: 2024-04-02

## 2024-04-01 RX ORDER — AMIODARONE HYDROCHLORIDE 200 MG/1
200 TABLET ORAL DAILY
Status: DISCONTINUED | OUTPATIENT
Start: 2024-04-23 | End: 2024-04-02

## 2024-04-01 RX ORDER — DILTIAZEM HCL/D5W 125 MG/125
5-15 PLASTIC BAG, INJECTION (ML) INTRAVENOUS
Status: DISCONTINUED | OUTPATIENT
Start: 2024-04-01 | End: 2024-04-02

## 2024-04-01 RX ORDER — AMIODARONE HYDROCHLORIDE 200 MG/1
200 TABLET ORAL ONCE
Qty: 1 TABLET | Refills: 0 | Status: DISCONTINUED | OUTPATIENT
Start: 2024-04-02 | End: 2024-04-02

## 2024-04-01 RX ORDER — ONDANSETRON 2 MG/ML
4 INJECTION INTRAMUSCULAR; INTRAVENOUS EVERY 6 HOURS PRN
Status: DISCONTINUED | OUTPATIENT
Start: 2024-04-01 | End: 2024-04-03 | Stop reason: HOSPADM

## 2024-04-01 RX ORDER — AMIODARONE HYDROCHLORIDE 200 MG/1
200 TABLET ORAL EVERY 12 HOURS
Qty: 28 TABLET | Refills: 0 | Status: DISCONTINUED | OUTPATIENT
Start: 2024-04-09 | End: 2024-04-02

## 2024-04-01 RX ADMIN — TRAZODONE HYDROCHLORIDE 100 MG: 100 TABLET ORAL at 21:33

## 2024-04-01 RX ADMIN — APIXABAN 5 MG: 5 TABLET, FILM COATED ORAL at 08:08

## 2024-04-01 RX ADMIN — GUAIFENESIN 600 MG: 600 TABLET, EXTENDED RELEASE ORAL at 21:33

## 2024-04-01 RX ADMIN — AMIODARONE HYDROCHLORIDE 150 MG: 1.5 INJECTION, SOLUTION INTRAVENOUS at 19:01

## 2024-04-01 RX ADMIN — APIXABAN 5 MG: 5 TABLET, FILM COATED ORAL at 21:33

## 2024-04-01 RX ADMIN — Medication 10 ML: at 08:10

## 2024-04-01 RX ADMIN — ROFLUMILAST 250 MCG: 500 TABLET ORAL at 08:08

## 2024-04-01 RX ADMIN — Medication 10 ML: at 21:34

## 2024-04-01 RX ADMIN — CALCIUM CARBONATE 2 TABLET: 500 TABLET, CHEWABLE ORAL at 12:21

## 2024-04-01 RX ADMIN — IPRATROPIUM BROMIDE 0.5 MG: 0.5 SOLUTION RESPIRATORY (INHALATION) at 08:26

## 2024-04-01 RX ADMIN — Medication 5 MG/HR: at 19:16

## 2024-04-01 RX ADMIN — FUROSEMIDE 20 MG: 10 INJECTION, SOLUTION INTRAMUSCULAR; INTRAVENOUS at 08:12

## 2024-04-01 RX ADMIN — GUAIFENESIN 600 MG: 600 TABLET, EXTENDED RELEASE ORAL at 08:08

## 2024-04-01 RX ADMIN — AMIODARONE HYDROCHLORIDE 1 MG/MIN: 1.8 INJECTION, SOLUTION INTRAVENOUS at 19:16

## 2024-04-01 RX ADMIN — SODIUM CHLORIDE 40 ML/HR: 9 INJECTION, SOLUTION INTRAVENOUS at 16:31

## 2024-04-01 RX ADMIN — IPRATROPIUM BROMIDE 0.5 MG: 0.5 SOLUTION RESPIRATORY (INHALATION) at 10:49

## 2024-04-01 RX ADMIN — ALPRAZOLAM 1 MG: 1 TABLET ORAL at 18:09

## 2024-04-01 RX ADMIN — METHYLPREDNISOLONE SODIUM SUCCINATE 40 MG: 40 INJECTION, POWDER, FOR SOLUTION INTRAMUSCULAR; INTRAVENOUS at 08:08

## 2024-04-01 RX ADMIN — BUDESONIDE 1 MG: 0.5 INHALANT RESPIRATORY (INHALATION) at 08:22

## 2024-04-01 RX ADMIN — Medication 5 MG/HR: at 18:25

## 2024-04-01 RX ADMIN — PANTOPRAZOLE SODIUM 40 MG: 40 TABLET, DELAYED RELEASE ORAL at 05:14

## 2024-04-01 RX ADMIN — HYDROCODONE BITARTRATE AND ACETAMINOPHEN 1 TABLET: 5; 325 TABLET ORAL at 21:33

## 2024-04-01 RX ADMIN — IPRATROPIUM BROMIDE 0.5 MG: 0.5 SOLUTION RESPIRATORY (INHALATION) at 15:50

## 2024-04-01 RX ADMIN — CEFTRIAXONE 2000 MG: 2 INJECTION, POWDER, FOR SOLUTION INTRAMUSCULAR; INTRAVENOUS at 17:09

## 2024-04-01 RX ADMIN — FUROSEMIDE 20 MG: 10 INJECTION, SOLUTION INTRAMUSCULAR; INTRAVENOUS at 21:33

## 2024-04-01 RX ADMIN — ALPRAZOLAM 1 MG: 1 TABLET ORAL at 08:08

## 2024-04-01 NOTE — PROGRESS NOTES
[facility] MEDICINE SERVICE  DAILY PROGRESS NOTE    NAME: Orlando Velasquez  : 1950  MRN: [id number]      LOS: 3 days     PROVIDER OF SERVICE: Roby Stacy MD    Chief Complaint: Respiratory failure    Subjective:     Interval History:  History taken from: patient    Subjective       Chief Complaint: shortness of air     History of Present Illness: Orlando Velasquez is a 73 y.o. male with a CMH of hypertension, hyperlipidemia, COPD, chronic respiratory failure presenting from outside facility with difficulty breathing.  He was being treated for pneumonia and COPD exacerbation and acute hypoxic hypercapnic respiratory failure.  He was kept on BiPAP intermittently.  Patient had CT scan done at outside facility which showed possible mucous plugging and he was transferred here for possible pulmonology consult and bronchoscopy.  As of right now patient is on 4 L of supplemental oxygen, at baseline he wears 3 L at home.  Patient also fell down a few days ago and has bruising on his face patient was also diagnosed with rhabdomyolysis and was being treated with IV fluids at outside facility.     3/30/24 patient seen and examined as sitting in recliner, dyspnea on 4 L of oxygen, fatigue and weakness, family at bedside, discussed with RN.  3/31/2024 patient sitting in recliner, family at bedside, dyspnea on 4 L, Multiple recurrent episode of respiratory distress after Mucomyst improved within half an hour   Feeling shaking and tremors, less coughing no nausea or vomiting, no change in bowel habit, no dysuria,  no new  skin rash or itching.  2024 patient seen and examined in bed no acute distress, vital signs stable, dyspnea on 3 L.  Family at bedside, discussed with RN.  Patient with weakness fatigue.  OT recommends SNF at [place] and will follow.      Review of Systems  Respiratory:  Positive for cough and shortness of breath.    Cardiovascular:  Negative for chest pain.   Objective:     Vital  Signs  Temp:  [97.4 °F (36.3 °C)-98.2 °F (36.8 °C)] 98.2 °F (36.8 °C)  Heart Rate:  [68-95] 75  Resp:  [14-26] 18  BP: (124-152)/(63-80) 124/63  Flow (L/min):  [4] 4   Body mass index is 34.06 kg/m².    Physical Exam  Constitutional:       General: He is not in acute distress.     Appearance: He is well-developed. He is not diaphoretic.   HENT:      Head: Normocephalic and atraumatic.   Cardiovascular:      Rate and Rhythm: Normal rate.   Pulmonary:      Effort: Pulmonary effort is normal. Respiratory distress present.      Breath sounds: No wheezing.   Abdominal:      General: There is no distension.      Palpations: Abdomen is soft.   Musculoskeletal:         General: Normal range of motion.   Skin:     General: Skin is warm and dry.   Neurological:      Mental Status: He is alert.      Cranial Nerves: No cranial nerve deficit.   Psychiatric:         Behavior: Behavior normal.         Thought Content: Thought content normal.         Judgment: Judgment normal.      Scheduled Meds   apixaban, 5 mg, Oral, BID  budesonide, 1 mg, Nebulization, BID - RT  cefTRIAXone, 2,000 mg, Intravenous, Q24H  furosemide, 20 mg, Intravenous, Q12H  guaiFENesin, 600 mg, Oral, Q12H  ipratropium, 0.5 mg, Nebulization, 4x Daily - RT  methylPREDNISolone sodium succinate, 40 mg, Intravenous, Q24H  pantoprazole, 40 mg, Oral, Q AM  roflumilast, 250 mcg, Oral, Daily  sodium chloride, 10 mL, Intravenous, Q12H       PRN Meds     ALPRAZolam    calcium carbonate    diphenhydrAMINE    ipratropium-albuterol    ondansetron    sodium chloride    sodium chloride    traZODone   Infusions  sodium chloride, 40 mL/hr, Last Rate: 40 mL/hr (04/01/24 0817)          Diagnostic Data    Results from last 7 days   Lab Units 03/31/24  0343   WBC 10*3/mm3 10.05   HEMOGLOBIN g/dL 10.0*   HEMATOCRIT % 33.7*   PLATELETS 10*3/mm3 247   GLUCOSE mg/dL 121*   CREATININE mg/dL 0.75*   BUN mg/dL 25*   SODIUM mmol/L 143   POTASSIUM mmol/L 4.2   ANION GAP mmol/L 6.0       XR  Chest 1 View    Result Date: 4/1/2024  Impression: 1.No radiographic findings of acute cardiopulmonary abnormality. 2.Emphysema. Electronically Signed: Josiah Weston  4/1/2024 7:40 AM EDT  Workstation ID: [id number]    XR Chest 1 View    Result Date: 3/31/2024  Impression: 1.No acute radiographic abnormality is identified. 2.Pulmonary emphysema. Electronically Signed: Santana Delgado MD  3/31/2024 11:51 AM EDT  Workstation ID: [id number]       I reviewed the patient's new clinical results.    Assessment/Plan:     Active and Resolved Problems  Active Hospital Problems    Diagnosis  POA    **Respiratory failure [J96.90]  Yes      Resolved Hospital Problems   No resolved problems to display.        Acute on chronic hypoxic hypercapnic respiratory failure-   -ABG will be ordered  -Oxygen titration  -Brovana budesonide DuoNeb  -Mucinex,   - BiPAP as needed,   -pulmonology consult     Pneumonia-IV antibiotics have been started, cultures will be ordered, continue to monitor     Atrial fibrillation-cardiology consult, we will continue to monitor heart rate, Cardizem will be given as needed     Rhabdomyolysis-continue with IV fluids continue monitoring     Hypertension-continue monitoring blood pressure       DVT prophylaxis:-SCD  Medical and mechanical DVT prophylaxis orders are present.         Code status is   Code Status and Medical Interventions:   Ordered at: 03/29/24 1533     Code Status (Patient has no pulse and is not breathing):    CPR (Attempt to Resuscitate)     Medical Interventions (Patient has pulse or is breathing):    Full Support       Plan for disposition:nh in 3 days    Time: 30 minutes    Signature: Electronically signed by Roby Stacy MD, 04/01/24, 12:31 EDT.  [facility] Hospitalist Team

## 2024-04-01 NOTE — CASE MANAGEMENT/SOCIAL WORK
Continued Stay Note  CONCHIS Quevedoyd     Patient Name: Orlando Velasquez  MRN: [id number]  Today's Date: 4/1/2024    Admit Date: 3/29/2024    Plan: [facility]. No precert required. PASRR approved. From home with spouse. Current home O2 (3L) through [place].   Discharge Plan       Row Name 04/01/24 1219       Plan    Plan [facility] accepted. No precert required. PASRR approved. From home with spouse. Current home O2 (3L) through [place].    Plan Comments CM discussed with Aydee Aguila that campus needed settings for bipap before he could admit but needed to know settings. CM contacted RT and provided settings to liaison for Avaps RR 20  Min 15 Max 25 EPAP 5 50%. Pharmacy updated for facility.                Palma Esteban RN     Office Phone: [phone]  Office Cell: [phone]

## 2024-04-01 NOTE — THERAPY TREATMENT NOTE
Acute Care - Speech Language Pathology   Swallow Treatment Note [facility]     Patient Name: Orlando Velasquez  : 1950  MRN: [id number]  Today's Date: 2024               Admit Date: 3/29/2024    Visit Dx:   No diagnosis found.  Patient Active Problem List   Diagnosis    Respiratory failure     Past Medical History:   Diagnosis Date    A-fib     Arthritis     Asthma     Cancer     COPD (chronic obstructive pulmonary disease)     DVT (deep venous thrombosis)     GERD (gastroesophageal reflux disease)     Hernia, abdominal     History of transfusion     PE (pulmonary thromboembolism)     Presence of IVC filter        SLP Recommendation and Plan    EDUCATION  The patient has been educated in the following areas:   Dysphagia (Swallowing Impairment) Oral Care/Hydration Modified Diet Instruction.        SLP GOALS       Row Name 24 1200       (LTG) Swallow    (LTG) Swallow The patient will maximize swallow function for least restrictive po diet, exhibiting no complications associated with dysphagia, adequate po intake, and demonstrating independent use of safe swallow compensations.  -PF    [place] (Swallow Long Term Goal) independently (over 90% accuracy)  -PF    Time Frame (Swallow Long Term Goal) by discharge  -PF    Progress/Outcomes (Swallow Long Term Goal) new goal  -PF       (STG) Swallow 1    (STG) Swallow 1 The patient will participate in ongoing assessment of swallow to determine need for further swallow re-evaluation and appropriateness/need for an instrumental assessment of swallow  -PF    [place] (Swallow Short Term Goal 1) with moderate cues (50-74% accuracy)  -PF    Time Frame (Swallow Short Term Goal 1) 1 week  -PF    Progress/Outcomes (Swallow Short Term Goal 1) new goal  -PF    Comment (Swallow Short Term Goal 1) see below  -PF       (STG) Swallow 2    (STG) Swallow 2 The patient will participate in ongoing assessment of swallow to determine need for further swallow re-evaluation  and appropriateness/need for an instrumental assessment of swallow, as well as participate in patient and caregiver education  -PF    [place] (Swallow Short Term Goal 2) with maximum cues (25-49% accuracy)  -PF    Time Frame (Swallow Short Term Goal 2) 1 week  -PF    Progress/Outcomes (Swallow Short Term Goal 2) new goal  -PF    Comment (Swallow Short Term Goal 2) Pt was seen for skilled dysphagia therapy to ensure tolerance of rec'd diet. Pt's present diet is regular and thin liquids. He is on 3L NC. Family at bedside reported pt's intake has been low, but no difficulty swallowing or w/ current diet. Upon entry, pt was sitting upright in bed a 90 degree angle hip flexion prior to consuming lunch. Mastication and oral transit unremarkable. Pt was able to self-feed this date. No oral residue, cough, throat clear, or other overt s/s of aspiration on any consistency. ST recommends pt to remain on reg/thins diet. He should continue to be up at a full 90 degree angle for all meals/medications, eat at a slow rate, alternate liquid and solid. Per above observations, pt is at baseline diet, he denied difficulty swallowing, no further ST services indicated. ST will sign off at this time. Re-consult if indicated. Thank you.  -PF       (STG) Swallow Management Recall Goal 1 (SLP)    Activity (Swallow Management Recall Goal 1, SLP) independent recall of;aspiration precautions  -PF    [place]/Accuracy (Swallow Management Recall Goal 1, SLP) with maximum cues (25-49% accuracy)  -PF    Time Frame (Swallow Management Recall Goal 1, SLP) short term goal (STG)  -PF    Progress/Outcomes (Swallow Management Recall Goal 1, SLP) new goal  -PF    Comment (Swallow Management Recall Goal 1, SLP) see above  -PF              User Key  (r) = Recorded By, (t) = Taken By, (c) = Cosigned By      Initials Name Provider Type    PF Fakto, [place], SLP Speech and Language Pathologist             NIKKI Benoit  4/1/2024

## 2024-04-01 NOTE — PROGRESS NOTES
Referring Provider: Roby Stacy MD    Reason for follow-up: Atrial fibrillation     Patient Care Team:  Jagdeep Dailey DO as PCP - General (Internal Medicine)      SUBJECTIVE  Sitting up in chair today.  Remains in normal sinus rhythm with heart rate in the 80s.  Complains of chest tightness.     ROS  Review of all systems negative except as indicated.    Since I have last seen, the patient has been without any chest discomfort, shortness of breath, palpitations, dizziness or syncope.  Denies having any headache, abdominal pain, nausea, vomiting, diarrhea, constipation, loss of weight or loss of appetite.  Denies having any excessive bruising, hematuria or blood in the stool.  ROS      Personal History:    Past Medical History:   Diagnosis Date    A-fib     Arthritis     Asthma     Cancer     COPD (chronic obstructive pulmonary disease)     DVT (deep venous thrombosis)     GERD (gastroesophageal reflux disease)     Hernia, abdominal     History of transfusion     PE (pulmonary thromboembolism)     Presence of IVC filter        History reviewed. No pertinent surgical history.    History reviewed. No pertinent family history.    Social History     Tobacco Use    Smoking status: Former     Current packs/day: 0.00     Types: Cigarettes     Quit date:      Years since quittin.2     Passive exposure: Past    Smokeless tobacco: Never   Vaping Use    Vaping status: Never Used   Substance Use Topics    Alcohol use: Never    Drug use: Never        Home meds:  Prior to Admission medications    Medication Sig Start Date End Date Taking? Authorizing Provider   ALPRAZolam (XANAX) 1 MG tablet Take 1 tablet by mouth 3 (Three) Times a Day As Needed for Anxiety.    ProviderLeonora MD   apixaban (ELIQUIS) 5 MG tablet tablet Take 1 tablet by mouth 2 (Two) Times a Day.    ProviderLeonora MD   furosemide (LASIX) 40 MG tablet Take 1 tablet by mouth Daily.    ProviderLeonora MD   omeprazole  "(priLOSEC) 40 MG capsule Take 1 capsule by mouth Daily.    Provider, MD Leonora   predniSONE (DELTASONE) 5 MG tablet Take 1 tablet by mouth Daily.    Provider, MD Leonora   traZODone (DESYREL) 50 MG tablet Take 1 tablet by mouth 3 (Three) Times a Day.    Provider, MD Leonora       Allergies:  Patient has no known allergies.    Scheduled Meds:apixaban, 5 mg, Oral, BID  budesonide, 1 mg, Nebulization, BID - RT  cefTRIAXone, 2,000 mg, Intravenous, Q24H  furosemide, 20 mg, Intravenous, Q12H  guaiFENesin, 600 mg, Oral, Q12H  ipratropium, 0.5 mg, Nebulization, 4x Daily - RT  methylPREDNISolone sodium succinate, 40 mg, Intravenous, Q24H  pantoprazole, 40 mg, Oral, Q AM  roflumilast, 250 mcg, Oral, Daily  sodium chloride, 10 mL, Intravenous, Q12H      Continuous Infusions:sodium chloride, 40 mL/hr, Last Rate: 40 mL/hr (03/29/24 1712)      PRN Meds:.  ALPRAZolam    calcium carbonate    diphenhydrAMINE    ipratropium-albuterol    sodium chloride    sodium chloride    traZODone      OBJECTIVE    Vital Signs  Vitals:    03/31/24 2337 04/01/24 0255 04/01/24 0304 04/01/24 0437   BP:       BP Location:       Patient Position:       Pulse: 73  68    Resp:  20  15   Temp:  97.4 °F (36.3 °C)  97.5 °F (36.4 °C)   TempSrc:  Axillary  Axillary   SpO2: 97%  96%    Weight:       Height:           Flowsheet Rows      Flowsheet Row First Filed Value   Admission Height 162.6 cm (64\") Documented at 03/29/2024 1640   Admission Weight 92.3 kg (203 lb 7.8 oz) Documented at 03/29/2024 1500              Intake/Output Summary (Last 24 hours) at 4/1/2024 0703  Last data filed at 4/1/2024 0100  Gross per 24 hour   Intake --   Output 2100 ml   Net -2100 ml          Telemetry: Sinus rhythm    Physical Exam:  The patient is alert, oriented and in mild respiratory distress  Vital signs as noted above.  Head and neck revealed no carotid bruits or jugular venous distention.  No thyromegaly or lymphadenopathy is present  Lungs clear.  No " wheezing.  Breath sounds are normal bilaterally.  Heart normal first and second heart sounds.  No murmur. No precordial rub is present.  No gallop is present.  Abdomen soft and nontender.  No organomegaly is present.  Extremities with good peripheral pulses without any pedal edema.  Skin warm and dry.  Musculoskeletal system is grossly normal.  CNS grossly normal.       Results Review:  I have personally reviewed the results from the time of this admission to 4/1/2024 07:03 EDT and agree with these findings:  []  Laboratory  []  Microbiology  []  Radiology  []  EKG/Telemetry   []  Cardiology/Vascular   []  Pathology  []  Old records  []  Other:    Most notable findings include:    Lab Results (last 24 hours)       Procedure Component Value Units Date/Time    BNP [[id number]]  (Abnormal) Collected: 03/31/24 0343    Specimen: Blood Updated: 03/31/24 1415     proBNP 2,225.0 pg/mL     Narrative:      This assay is used as an aid in the diagnosis of individuals suspected of having heart failure. It can be used as an aid in the diagnosis of acute decompensated heart failure (ADHF) in patients presenting with signs and symptoms of ADHF to the emergency department (ED). In addition, NT-proBNP of <300 pg/mL indicates ADHF is not likely.    Age Range Result Interpretation  NT-proBNP Concentration (pg/mL:      <50             Positive            >450                   Gray                 300-450                    Negative             <300    50-75           Positive            >900                  Gray                300-900                  Negative            <300      >75             Positive            >1800                  Gray                300-1800                  Negative            <300    Blood Culture - Blood, Hand, Left [[id number]]  (Normal) Collected: 03/30/24 1250    Specimen: Blood from Hand, Left Updated: 03/31/24 1302     Blood Culture No growth at 24 hours    Blood Culture - Blood, Arm, Right [[id number]]   (Normal) Collected: 03/30/24 1251    Specimen: Blood from Arm, Right Updated: 03/31/24 1302     Blood Culture No growth at 24 hours            Imaging Results (Last 24 Hours)       Procedure Component Value Units Date/Time    XR Chest 1 View [[id number]] Resulted: 04/01/24 0554     Updated: 04/01/24 0555    XR Chest 1 View [[id number]] Collected: 03/31/24 1150     Updated: 03/31/24 1153    Narrative:      XR CHEST 1 VW    Date of Exam: 3/31/2024 11:46 AM EDT    Indication: Shortness of breath    Comparison: Chest x-ray dated 3/26/2024    Findings:  Lungs are hyperinflated without consolidation or mass. No pleural effusion or pneumothorax is identified. The cardiomediastinal silhouette and pulmonary vasculature appear within normal limits. No acute or suspicious osseous lesion is identified.       Impression:      Impression:  1.No acute radiographic abnormality is identified.  2.Pulmonary emphysema.      Electronically Signed: Santana Delgado MD    3/31/2024 11:51 AM EDT    Workstation ID: [id number]            LAB RESULTS (LAST 7 DAYS)    CBC  Results from last 7 days   Lab Units 03/31/24  0343 03/30/24  0450   WBC 10*3/mm3 10.05 8.51   RBC 10*6/mm3 3.76* 3.96*   HEMOGLOBIN g/dL 10.0* 10.5*   HEMATOCRIT % 33.7* 36.0*   MCV fL 89.6 90.9   PLATELETS 10*3/mm3 247 241       BMP  Results from last 7 days   Lab Units 03/31/24  0343 03/30/24  1251 03/30/24  0450   SODIUM mmol/L 143 144 141   POTASSIUM mmol/L 4.2 4.2 4.5   CHLORIDE mmol/L 106 102 103   CO2 mmol/L 31.0* 33.0* 32.0*   BUN mg/dL 25* 23 19   CREATININE mg/dL 0.75* 0.85 0.75*   GLUCOSE mg/dL 121* 137* 116*   MAGNESIUM mg/dL 1.9 2.1 2.0       CMP   Results from last 7 days   Lab Units 03/31/24  0343 03/30/24  1251 03/30/24  0450   SODIUM mmol/L 143 144 141   POTASSIUM mmol/L 4.2 4.2 4.5   CHLORIDE mmol/L 106 102 103   CO2 mmol/L 31.0* 33.0* 32.0*   BUN mg/dL 25* 23 19   CREATININE mg/dL 0.75* 0.85 0.75*   GLUCOSE mg/dL 121* 137* 116*       BNP        TROPONIN         CoAg        Creatinine Clearance  Estimated Creatinine Clearance: 90 mL/min (A) (by C-G formula based on SCr of 0.75 mg/dL (L)).    ABG  Results from last 7 days   Lab Units 03/29/24  1606   PH, ARTERIAL pH units 7.371   PCO2, ARTERIAL mm Hg 66.4*   PO2 ART mm Hg 110.1*   O2 SATURATION ART % 97.9   BASE EXCESS ART mmol/L 10.8*       Radiology  XR Chest 1 View    Result Date: 3/31/2024  Impression: 1.No acute radiographic abnormality is identified. 2.Pulmonary emphysema. Electronically Signed: Santana Delgado MD  3/31/2024 11:51 AM EDT  Workstation ID: [id number]       EKG  I personally viewed and interpreted the patient's EKG/Telemetry data:  ECG 12 Lead Rhythm Change   Final Result   HEART RATE= 72  bpm   RR Interval= 836  ms   MT Interval= 153  ms   P Horizontal Axis= 28  deg   P Front Axis= 40  deg   QRSD Interval= 90  ms   QT Interval= 353  ms   QTcB= 386  ms   QRS Axis= -39  deg   T Wave Axis= 64  deg   - OTHERWISE NORMAL ECG -   Sinus rhythm   Left axis deviation   No previous ECG available for comparison   Electronically Signed By: Clinton Fisher (DEO) 30-Mar-2024 21:01:59   Date and Time of Study: 2024-03-29 17:35:11      Telemetry Scan   Final Result      Telemetry Scan   Final Result            Echocardiogram:          Stress Test:         Cardiac Catheterization:  No results found for this or any previous visit.         Other:         ASSESSMENT & PLAN:    Principal Problem:    Respiratory failure    Atrial fibrillation  [id number] score is 4  Continue Eliquis for stroke prevention  Currently sinus rhythm  Cardizem has been stopped  Avoiding beta-blocker due to severe COPD.  RVR may have been precipitated by respiratory distress.    Continue telemetry  Will use Cardizem as needed    Elevated proBNP  Appears euvolemic  Pending echocardiogram  Recently received IV fluids due to rhabdomyolysis    Rhabdomyolysis  Renal function has normalized.  IV fluids can be stopped     Hyperlipidemia  Start high  intensity statin  LDL 80, HDL 34, triglyceride 113 and total cholesterol 135     COPD/pneumonia  Acute hypoxemic hypercapnic respiratory failure.  Currently on antibiotics, bronchodilators and steroids  Supplemental oxygen as needed  Discontinue Mucomyst due to possible reaction  Possible bronchoscopy if respiratory status does not improve    Chest pain  Negative troponin and no ECG changes  Continue to monitor.       History of pulmonary embolism in 2018  History of IVC filter which was removed in 2019     Obesity  BMI of 34 start statin  Obesity complicates all aspects of his care  Lifestyle modifications recommended to the patient      Clinton Fisher MD  04/01/24  07:03 EDT

## 2024-04-01 NOTE — SIGNIFICANT NOTE
04/01/24 1647   OTHER   Discipline physical therapist   Rehab Time/Intention   Session Not Performed patient unavailable for treatment  (Pt receiving Echo this PM, PT not able to check back. Will follow-up tomorrow.)   Therapy Assessment/Plan (PT)   Criteria for Skilled Interventions Met (PT) yes;meets criteria   Recommendation   PT - Next Appointment 04/02/24

## 2024-04-01 NOTE — PLAN OF CARE
Goal Outcome Evaluation:           Patient is alert and oriented x4, Patient wore bipap throughtout the night.   he maintained sats btw 96-98 Patient had tremors and also complained of anxiety,MD on call was reached out to and po xanax also ordered,patient still claims symptoms were not relieved. Tremors still present. VSS at this time

## 2024-04-01 NOTE — PLAN OF CARE
Goal Outcome Evaluation:           Progress: improving  Outcome Evaluation: pt transferred to us from OSH. Being treated for pneumonia and COPD exacerbation. Oxygen has been turned down back to baseline of 3L. Pt still getting IV fluids. CXR this morning showed emphysema. Pt complaining of CP this morning, EKG obtained, anxiety medication given and CP was resolved. PT/OT recommending rehab.

## 2024-04-02 PROCEDURE — 97110 THERAPEUTIC EXERCISES: CPT

## 2024-04-02 PROCEDURE — 94761 N-INVAS EAR/PLS OXIMETRY MLT: CPT

## 2024-04-02 PROCEDURE — 25010000002 METHYLPREDNISOLONE PER 40 MG: Performed by: INTERNAL MEDICINE

## 2024-04-02 PROCEDURE — 94660 CPAP INITIATION&MGMT: CPT

## 2024-04-02 PROCEDURE — 25010000002 CEFTRIAXONE PER 250 MG: Performed by: FAMILY MEDICINE

## 2024-04-02 PROCEDURE — 99233 SBSQ HOSP IP/OBS HIGH 50: CPT | Performed by: INTERNAL MEDICINE

## 2024-04-02 PROCEDURE — 97116 GAIT TRAINING THERAPY: CPT

## 2024-04-02 PROCEDURE — 94799 UNLISTED PULMONARY SVC/PX: CPT

## 2024-04-02 PROCEDURE — 94664 DEMO&/EVAL PT USE INHALER: CPT

## 2024-04-02 PROCEDURE — 25010000002 AMIODARONE IN DEXTROSE 5% 360-4.14 MG/200ML-% SOLUTION: Performed by: INTERNAL MEDICINE

## 2024-04-02 PROCEDURE — 93005 ELECTROCARDIOGRAM TRACING: CPT | Performed by: INTERNAL MEDICINE

## 2024-04-02 PROCEDURE — 93010 ELECTROCARDIOGRAM REPORT: CPT | Performed by: INTERNAL MEDICINE

## 2024-04-02 RX ORDER — AMIODARONE HYDROCHLORIDE 200 MG/1
200 TABLET ORAL EVERY 12 HOURS SCHEDULED
Status: DISCONTINUED | OUTPATIENT
Start: 2024-04-02 | End: 2024-04-03

## 2024-04-02 RX ORDER — FUROSEMIDE 40 MG/1
40 TABLET ORAL DAILY
Status: DISCONTINUED | OUTPATIENT
Start: 2024-04-02 | End: 2024-04-03 | Stop reason: HOSPADM

## 2024-04-02 RX ORDER — METOPROLOL SUCCINATE 25 MG/1
25 TABLET, EXTENDED RELEASE ORAL DAILY
Status: DISCONTINUED | OUTPATIENT
Start: 2024-04-02 | End: 2024-04-03 | Stop reason: HOSPADM

## 2024-04-02 RX ADMIN — ALPRAZOLAM 1 MG: 1 TABLET ORAL at 14:32

## 2024-04-02 RX ADMIN — CEFTRIAXONE 2000 MG: 2 INJECTION, POWDER, FOR SOLUTION INTRAMUSCULAR; INTRAVENOUS at 16:35

## 2024-04-02 RX ADMIN — BUDESONIDE 1 MG: 0.5 INHALANT RESPIRATORY (INHALATION) at 06:11

## 2024-04-02 RX ADMIN — APIXABAN 5 MG: 5 TABLET, FILM COATED ORAL at 08:03

## 2024-04-02 RX ADMIN — CALCIUM CARBONATE 2 TABLET: 500 TABLET, CHEWABLE ORAL at 19:18

## 2024-04-02 RX ADMIN — APIXABAN 5 MG: 5 TABLET, FILM COATED ORAL at 20:57

## 2024-04-02 RX ADMIN — AMIODARONE HYDROCHLORIDE 200 MG: 200 TABLET ORAL at 09:35

## 2024-04-02 RX ADMIN — CALCIUM CARBONATE 2 TABLET: 500 TABLET, CHEWABLE ORAL at 10:50

## 2024-04-02 RX ADMIN — PANTOPRAZOLE SODIUM 40 MG: 40 TABLET, DELAYED RELEASE ORAL at 05:32

## 2024-04-02 RX ADMIN — BUDESONIDE 1 MG: 0.5 INHALANT RESPIRATORY (INHALATION) at 19:13

## 2024-04-02 RX ADMIN — ROFLUMILAST 250 MCG: 500 TABLET ORAL at 08:03

## 2024-04-02 RX ADMIN — ALPRAZOLAM 1 MG: 1 TABLET ORAL at 07:41

## 2024-04-02 RX ADMIN — GUAIFENESIN 600 MG: 600 TABLET, EXTENDED RELEASE ORAL at 20:57

## 2024-04-02 RX ADMIN — IPRATROPIUM BROMIDE 0.5 MG: 0.5 SOLUTION RESPIRATORY (INHALATION) at 15:00

## 2024-04-02 RX ADMIN — GUAIFENESIN 600 MG: 600 TABLET, EXTENDED RELEASE ORAL at 08:03

## 2024-04-02 RX ADMIN — AMIODARONE HYDROCHLORIDE 0.5 MG/MIN: 1.8 INJECTION, SOLUTION INTRAVENOUS at 00:08

## 2024-04-02 RX ADMIN — IPRATROPIUM BROMIDE AND ALBUTEROL SULFATE 3 ML: .5; 3 SOLUTION RESPIRATORY (INHALATION) at 03:43

## 2024-04-02 RX ADMIN — METOPROLOL SUCCINATE 25 MG: 25 TABLET, FILM COATED, EXTENDED RELEASE ORAL at 09:35

## 2024-04-02 RX ADMIN — IPRATROPIUM BROMIDE 0.5 MG: 0.5 SOLUTION RESPIRATORY (INHALATION) at 06:16

## 2024-04-02 RX ADMIN — METHYLPREDNISOLONE SODIUM SUCCINATE 40 MG: 40 INJECTION, POWDER, FOR SOLUTION INTRAMUSCULAR; INTRAVENOUS at 08:03

## 2024-04-02 RX ADMIN — ALPRAZOLAM 1 MG: 1 TABLET ORAL at 20:57

## 2024-04-02 RX ADMIN — IPRATROPIUM BROMIDE 0.5 MG: 0.5 SOLUTION RESPIRATORY (INHALATION) at 10:29

## 2024-04-02 RX ADMIN — AMIODARONE HYDROCHLORIDE 200 MG: 200 TABLET ORAL at 20:57

## 2024-04-02 RX ADMIN — TRAZODONE HYDROCHLORIDE 100 MG: 100 TABLET ORAL at 20:57

## 2024-04-02 RX ADMIN — Medication 10 ML: at 08:04

## 2024-04-02 RX ADMIN — IPRATROPIUM BROMIDE 0.5 MG: 0.5 SOLUTION RESPIRATORY (INHALATION) at 19:19

## 2024-04-02 RX ADMIN — Medication 10 ML: at 20:57

## 2024-04-02 NOTE — PROGRESS NOTES
Daily Progress Note          Assessment    Acute respiratory distress   Pneumonia   COPD   Asthma   Idiopathic scoliosis   A-fib   Hx PE/DVT   GERD   Anemia   RLS   Panic attacks/Anxiety     Results for orders placed during the hospital encounter of 03/29/24    Adult Transthoracic Echo Complete W/ Cont if Necessary Per Protocol    Interpretation Summary    Left ventricular systolic function is normal. Calculated left ventricular EF = 59% Left ventricular ejection fraction appears to be 56 - 60%.    Left ventricular diastolic function is consistent with (grade I) impaired relaxation.  GLS -15.3%.    Estimated right ventricular systolic pressure from tricuspid regurgitation is normal (<35 mmHg).    No significant valvular abnormalities noted.      Plan:  Oxygen supplement and titration to maintain saturation 90-95%: Currently on 3 L per nasal cannula  Inhaled corticosteroids  IV steroids  Daliresp  Mucinex  Incentive spirometry  HR/BP control as per cardiology  Diuresis  Anticoagulation: Eliquis             LOS: 4 days     Subjective     Patient reports mild cough shortness of breath    Objective     Vital signs for last 24 hours:  Vitals:    04/02/24 0611 04/02/24 0615 04/02/24 0616 04/02/24 0618   BP: 99/55      BP Location:       Patient Position:       Pulse: 84 78 75 77   Resp: 14 14 14 14   Temp:       TempSrc:       SpO2: 94% 95% 96% 97%   Weight:       Height:           Intake/Output last 3 shifts:  I/O last 3 completed shifts:  In: -   Out: 2350 [Urine:2350]  Intake/Output this shift:  No intake/output data recorded.      Radiology  Imaging Results (Last 24 Hours)       ** No results found for the last 24 hours. **            Labs:  Results from last 7 days   Lab Units 03/31/24  0343   WBC 10*3/mm3 10.05   HEMOGLOBIN g/dL 10.0*   HEMATOCRIT % 33.7*   PLATELETS 10*3/mm3 247     Results from last 7 days   Lab Units 03/31/24  0343   SODIUM mmol/L 143   POTASSIUM mmol/L 4.2   CHLORIDE mmol/L 106   CO2 mmol/L  31.0*   BUN mg/dL 25*   CREATININE mg/dL 0.75*   CALCIUM mg/dL 8.3*   GLUCOSE mg/dL 121*     Results from last 7 days   Lab Units 03/29/24  1606   PH, ARTERIAL pH units 7.371   PO2 ART mm Hg 110.1*   PCO2, ARTERIAL mm Hg 66.4*   HCO3 ART mmol/L 38.5*         Results from last 7 days   Lab Units 04/01/24  1325 04/01/24  1105   HSTROP T ng/L 14 20         Results from last 7 days   Lab Units 03/31/24  0343   MAGNESIUM mg/dL 1.9                   Meds:   SCHEDULE  amiodarone, 200 mg, Oral, Once   Followed by  [START ON 4/3/2024] amiodarone, 200 mg, Oral, Q8H   Followed by  [START ON 4/9/2024] amiodarone, 200 mg, Oral, Q12H   Followed by  [START ON 4/23/2024] amiodarone, 200 mg, Oral, Daily  apixaban, 5 mg, Oral, BID  budesonide, 1 mg, Nebulization, BID - RT  cefTRIAXone, 2,000 mg, Intravenous, Q24H  furosemide, 20 mg, Intravenous, Q12H  guaiFENesin, 600 mg, Oral, Q12H  ipratropium, 0.5 mg, Nebulization, 4x Daily - RT  methylPREDNISolone sodium succinate, 40 mg, Intravenous, Q24H  pantoprazole, 40 mg, Oral, Q AM  roflumilast, 250 mcg, Oral, Daily  sodium chloride, 10 mL, Intravenous, Q12H      Infusions  amiodarone, 0.5 mg/min, Last Rate: 0.5 mg/min (04/02/24 0008)  dilTIAZem, 5-15 mg/hr, Last Rate: 5 mg/hr (04/01/24 1916)  sodium chloride, 40 mL/hr, Last Rate: 40 mL/hr (04/01/24 1631)      PRNs    ALPRAZolam    calcium carbonate    diphenhydrAMINE    ipratropium-albuterol    ondansetron    sodium chloride    sodium chloride    traZODone    Physical Exam:  General Appearance:  Alert   HEENT:  Normocephalic, without obvious abnormality, Conjunctiva/corneas clear,.   Nares normal, no drainage     Neck:  Supple, symmetrical, trachea midline.   Lungs /Chest wall:   Bilateral basal rhonchi, respirations unlabored, symmetrical wall movement.     Heart: Rate controlled, S1 S2 normal  Abdomen: Soft, non-tender, no masses, no organomegaly.    Extremities: Trace edema, no clubbing or cyanosis     ROS  Constitutional: Negative for  chills, fever and malaise/fatigue.   HENT: Negative.    Eyes: Negative.    Cardiovascular: Negative.    Respiratory: Positive for cough and shortness of breath.    Skin: Negative.    Musculoskeletal: Negative.    Gastrointestinal: Negative.    Genitourinary: Negative.    Neurological: Generalized weakness      I reviewed the recent clinical results  I personally reviewed the latest radiological studies    Part of this note may be an electronic transcription/translation of spoken language to printed text using the Dragon Dictation System.

## 2024-04-02 NOTE — PROGRESS NOTES
[facility] MEDICINE SERVICE  DAILY PROGRESS NOTE    NAME: Orlando Velasquez  : 1950  MRN: [id number]      LOS: 4 days     PROVIDER OF SERVICE: Roby Stacy MD    Chief Complaint: Respiratory failure    Subjective:     Interval History:  History taken from: patient    Subjective       Chief Complaint: shortness of air     History of Present Illness: rOlando Velasquez is a 73 y.o. male with a CMH of hypertension, hyperlipidemia, COPD, chronic respiratory failure presenting from outside facility with difficulty breathing.  He was being treated for pneumonia and COPD exacerbation and acute hypoxic hypercapnic respiratory failure.  He was kept on BiPAP intermittently.  Patient had CT scan done at outside facility which showed possible mucous plugging and he was transferred here for possible pulmonology consult and bronchoscopy.  As of right now patient is on 4 L of supplemental oxygen, at baseline he wears 3 L at home.  Patient also fell down a few days ago and has bruising on his face patient was also diagnosed with rhabdomyolysis and was being treated with IV fluids at outside facility.     3/30/24 patient seen and examined as sitting in recliner, dyspnea on 4 L of oxygen, fatigue and weakness, family at bedside, discussed with RN.  3/31/2024 patient sitting in recliner, family at bedside, dyspnea on 4 L, Multiple recurrent episode of respiratory distress after Mucomyst improved within half an hour   Feeling shaking and tremors, less coughing no nausea or vomiting, no change in bowel habit, no dysuria,  no new  skin rash or itching.  2024 patient seen and examined in bed no acute distress, vital signs stable, dyspnea on 3 L.  Family at bedside, discussed with RN.  Patient with weakness fatigue.  OT recommends SNF at [place] and will follow.   24 patient seen today in bed NAD, dyspnea on 3 lts, amiodarone changed to po, DW RN     Review of Systems  Respiratory:  Positive for cough and shortness  of breath.    Cardiovascular:  Negative for chest pain.   Objective:     Vital Signs  Temp:  [97.5 °F (36.4 °C)-98.2 °F (36.8 °C)] 98.1 °F (36.7 °C)  Heart Rate:  [] 75  Resp:  [14-22] 18  BP: ()/(50-95) 119/95  Flow (L/min):  [3-4] 3   Body mass index is 33.53 kg/m².    Physical Exam  Constitutional:       General: He is not in acute distress.     Appearance: He is well-developed. He is not diaphoretic.   HENT:      Head: Normocephalic and atraumatic.   Cardiovascular:      Rate and Rhythm: Normal rate.   Pulmonary:      Effort: Pulmonary effort is normal. Respiratory distress present.      Breath sounds: No wheezing.   Abdominal:      General: There is no distension.      Palpations: Abdomen is soft.   Musculoskeletal:         General: Normal range of motion.   Skin:     General: Skin is warm and dry.   Neurological:      Mental Status: He is alert.      Cranial Nerves: No cranial nerve deficit.   Psychiatric:         Behavior: Behavior normal.         Thought Content: Thought content normal.         Judgment: Judgment normal.      Scheduled Meds   amiodarone, 200 mg, Oral, Q12H  apixaban, 5 mg, Oral, BID  budesonide, 1 mg, Nebulization, BID - RT  cefTRIAXone, 2,000 mg, Intravenous, Q24H  furosemide, 40 mg, Oral, Daily  guaiFENesin, 600 mg, Oral, Q12H  ipratropium, 0.5 mg, Nebulization, 4x Daily - RT  methylPREDNISolone sodium succinate, 40 mg, Intravenous, Q24H  metoprolol succinate XL, 25 mg, Oral, Daily  pantoprazole, 40 mg, Oral, Q AM  roflumilast, 250 mcg, Oral, Daily  sodium chloride, 10 mL, Intravenous, Q12H       PRN Meds     ALPRAZolam    calcium carbonate    diphenhydrAMINE    ipratropium-albuterol    ondansetron    sodium chloride    sodium chloride    traZODone   Infusions           Diagnostic Data    Results from last 7 days   Lab Units 03/31/24  0343   WBC 10*3/mm3 10.05   HEMOGLOBIN g/dL 10.0*   HEMATOCRIT % 33.7*   PLATELETS 10*3/mm3 247   GLUCOSE mg/dL 121*   CREATININE mg/dL 0.75*    BUN mg/dL 25*   SODIUM mmol/L 143   POTASSIUM mmol/L 4.2   ANION GAP mmol/L 6.0       XR Chest 1 View    Result Date: 4/1/2024  Impression: 1.No radiographic findings of acute cardiopulmonary abnormality. 2.Emphysema. Electronically Signed: Josiah Enrico  4/1/2024 7:40 AM EDT  Workstation ID: [id number]       I reviewed the patient's new clinical results.    Assessment/Plan:     Active and Resolved Problems  Active Hospital Problems    Diagnosis  POA    **Respiratory failure [J96.90]  Yes      Resolved Hospital Problems   No resolved problems to display.     Acute on chronic hypoxic hypercapnic respiratory failure- COPD/pneumonia  Acute hypoxemic hypercapnic respiratory failure.  Currently on antibiotics, bronchodilators and steroids  Supplemental oxygen as needed  Discontinued Mucomyst due to possible reaction  Possible bronchoscopy if respiratory status does not improve  -Oxygen titration  -Brovana budesonide DuoNeb  -Mucinex,   -BiPAP as needed,   -pulmonology consult     Pneumonia-IV antibiotics have been started, cultures will be ordered, continue to monitor     Atrial fibrillation-cardiology consult, we will continue to monitor heart rate,   [id number] score is 4  Continue Eliquis for stroke prevention  A-fib RVR requiring amiodarone and Cardizem drips  Both drips will be stopped.  Start amiodarone 200 mg p.o. twice daily.  Start Toprol-XL 25 mg p.o. twice daily  Holding off on Cardizem due to low blood pressure  RVR may have been precipitated by respiratory distress.      Elevated proBNP  Appears euvolemic  Echocardiogram shows preserved LV function, grade 1 diastolic dysfunction.  Recently received IV fluids due to rhabdomyolysis  IV Lasix will be switched to oral maintenance Lasix.     Rhabdomyolysis- dc IV fluids continue monitoring  Renal function has normalized.  IV fluids can be stopped     Hypertension-continue monitoring blood pressure    Chest pain  Negative troponin and no ECG changes  Continue to  monitor.  Likely musculoskeletal discomfort from effort of breathing     DVT prophylaxis:-SCD  Medical and mechanical DVT prophylaxis orders are present.    Code status is   Code Status and Medical Interventions:   Ordered at: 03/29/24 1533     Code Status (Patient has no pulse and is not breathing):    CPR (Attempt to Resuscitate)     Medical Interventions (Patient has pulse or is breathing):    Full Support     Plan for disposition:nh in 3 days    Time: 30 minutes    Signature: Electronically signed by Roby Stacy MD, 04/02/24, 12:36 EDT.  [facility] Hospitalist Team

## 2024-04-02 NOTE — PLAN OF CARE
Goal Outcome Evaluation:  Plan of Care Reviewed With: patient        Progress: improving  Outcome Evaluation: Pt resting in room today. Amio gtt turned off and transitioned to PO. Pt in normal sinus. Will discharde to rehab facility when ready. No complaints at this time.

## 2024-04-02 NOTE — CASE MANAGEMENT/SOCIAL WORK
Continued Stay Note  CONCHIS Zaid     Patient Name: Orlando Velasquez  MRN: [id number]  Today's Date: 4/2/2024    Admit Date: 3/29/2024    Plan: [facility]. No precert required. PASRR approved. From home with spouse. Current home O2 (3L) through [place].   Discharge Plan       Row Name 04/02/24 1115       Plan    Plan Comments Requested bipap order with settings from pulmonology. Updated Aydee Aguila that order was in place. Liaison confirmed that bipap was ordered at facility and it is anticipated to be delivered by tomorrow morning, 4/3 and she will confirm once it has arrived.               Expected Discharge Date and Time       Expected Discharge Date Expected Discharge Time    Apr 3, 2024           Palma Esteban RN     Office Phone: [phone]  Office Cell: [phone]

## 2024-04-02 NOTE — PROGRESS NOTES
Referring Provider: Roby Stacy MD    Reason for follow-up: Atrial fibrillation     Patient Care Team:  Jagdeep Dailey DO as PCP - General (Internal Medicine)      SUBJECTIVE  Went into A-fib RVR yesterday requiring amiodarone and Cardizem drips.  He has now converted to sinus rhythm again.     ROS  Review of all systems negative except as indicated.    Since I have last seen, the patient has been without any chest discomfort, shortness of breath, palpitations, dizziness or syncope.  Denies having any headache, abdominal pain, nausea, vomiting, diarrhea, constipation, loss of weight or loss of appetite.  Denies having any excessive bruising, hematuria or blood in the stool.  ROS      Personal History:    Past Medical History:   Diagnosis Date    A-fib     Arthritis     Asthma     Cancer     COPD (chronic obstructive pulmonary disease)     DVT (deep venous thrombosis)     GERD (gastroesophageal reflux disease)     Hernia, abdominal     History of transfusion     PE (pulmonary thromboembolism)     Presence of IVC filter        History reviewed. No pertinent surgical history.    History reviewed. No pertinent family history.    Social History     Tobacco Use    Smoking status: Former     Current packs/day: 0.00     Types: Cigarettes     Quit date:      Years since quittin.2     Passive exposure: Past    Smokeless tobacco: Never   Vaping Use    Vaping status: Never Used   Substance Use Topics    Alcohol use: Never    Drug use: Never        Home meds:  Prior to Admission medications    Medication Sig Start Date End Date Taking? Authorizing Provider   ALPRAZolam (XANAX) 1 MG tablet Take 1 tablet by mouth 3 (Three) Times a Day As Needed for Anxiety.    ProviderLeonora MD   apixaban (ELIQUIS) 5 MG tablet tablet Take 1 tablet by mouth 2 (Two) Times a Day.    ProviderLeonora MD   furosemide (LASIX) 40 MG tablet Take 1 tablet by mouth Daily.    ProviderLeonora MD   omeprazole (priLOSEC)  "40 MG capsule Take 1 capsule by mouth Daily.    Provider, MD Leonora   predniSONE (DELTASONE) 5 MG tablet Take 1 tablet by mouth Daily.    ProviderLeonora MD   traZODone (DESYREL) 50 MG tablet Take 1 tablet by mouth 3 (Three) Times a Day.    ProviderLeonora MD       Allergies:  Patient has no known allergies.    Scheduled Meds:amiodarone, 200 mg, Oral, Once   Followed by  [START ON 4/3/2024] amiodarone, 200 mg, Oral, Q8H   Followed by  [START ON 4/9/2024] amiodarone, 200 mg, Oral, Q12H   Followed by  [START ON 4/23/2024] amiodarone, 200 mg, Oral, Daily  apixaban, 5 mg, Oral, BID  budesonide, 1 mg, Nebulization, BID - RT  cefTRIAXone, 2,000 mg, Intravenous, Q24H  furosemide, 20 mg, Intravenous, Q12H  guaiFENesin, 600 mg, Oral, Q12H  ipratropium, 0.5 mg, Nebulization, 4x Daily - RT  methylPREDNISolone sodium succinate, 40 mg, Intravenous, Q24H  pantoprazole, 40 mg, Oral, Q AM  roflumilast, 250 mcg, Oral, Daily  sodium chloride, 10 mL, Intravenous, Q12H      Continuous Infusions:amiodarone, 0.5 mg/min, Last Rate: 0.5 mg/min (04/02/24 0008)  dilTIAZem, 5-15 mg/hr, Last Rate: 5 mg/hr (04/01/24 1916)  sodium chloride, 40 mL/hr, Last Rate: 40 mL/hr (04/01/24 1631)      PRN Meds:.  ALPRAZolam    calcium carbonate    diphenhydrAMINE    ipratropium-albuterol    ondansetron    sodium chloride    sodium chloride    traZODone      OBJECTIVE    Vital Signs  Vitals:    04/02/24 0611 04/02/24 0615 04/02/24 0616 04/02/24 0618   BP: 99/55      BP Location:       Patient Position:       Pulse: 84 78 75 77   Resp: 14 14 14 14   Temp:       TempSrc:       SpO2: 94% 95% 96% 97%   Weight:       Height:           Flowsheet Rows      Flowsheet Row First Filed Value   Admission Height 162.6 cm (64\") Documented at 03/29/2024 1640   Admission Weight 92.3 kg (203 lb 7.8 oz) Documented at 03/29/2024 1500              Intake/Output Summary (Last 24 hours) at 4/2/2024 0719  Last data filed at 4/2/2024 0530  Gross per 24 hour "   Intake --   Output 950 ml   Net -950 ml          Telemetry: Sinus rhythm    Physical Exam:  The patient is alert, oriented and in mild respiratory distress  Vital signs as noted above.  Head and neck revealed no carotid bruits or jugular venous distention.  No thyromegaly or lymphadenopathy is present  Lungs clear.  No wheezing.  Breath sounds are normal bilaterally.  Heart normal first and second heart sounds.  No murmur. No precordial rub is present.  No gallop is present.  Abdomen soft and nontender.  No organomegaly is present.  Extremities with good peripheral pulses without any pedal edema.  Skin warm and dry.  Musculoskeletal system is grossly normal.  CNS grossly normal.       Results Review:  I have personally reviewed the results from the time of this admission to 4/2/2024 07:19 EDT and agree with these findings:  []  Laboratory  []  Microbiology  []  Radiology  []  EKG/Telemetry   []  Cardiology/Vascular   []  Pathology  []  Old records  []  Other:    Most notable findings include:    Lab Results (last 24 hours)       Procedure Component Value Units Date/Time    High Sensitivity Troponin T 2Hr [[id number]]  (Abnormal) Collected: 04/01/24 1325    Specimen: Blood Updated: 04/01/24 1349     HS Troponin T 14 ng/L      Troponin T Delta -6 ng/L     Narrative:      High Sensitive Troponin T Reference Range:  <14.0 ng/L- Negative Female for AMI  <22.0 ng/L- Negative Male for AMI  >=14 - Abnormal Female indicating possible myocardial injury.  >=22 - Abnormal Male indicating possible myocardial injury.   Clinicians would have to utilize clinical acumen, EKG, Troponin, and serial changes to determine if it is an Acute Myocardial Infarction or myocardial injury due to an underlying chronic condition.         Blood Culture - Blood, Hand, Left [[id number]]  (Normal) Collected: 03/30/24 1250    Specimen: Blood from Hand, Left Updated: 04/01/24 1300     Blood Culture No growth at 2 days    Blood Culture - Blood, Arm,  Right [[id number]]  (Normal) Collected: 03/30/24 1251    Specimen: Blood from Arm, Right Updated: 04/01/24 1300     Blood Culture No growth at 2 days    High Sensitivity Troponin T [[id number]]  (Normal) Collected: 04/01/24 1105    Specimen: Blood Updated: 04/01/24 1232     HS Troponin T 20 ng/L     Narrative:      High Sensitive Troponin T Reference Range:  <14.0 ng/L- Negative Female for AMI  <22.0 ng/L- Negative Male for AMI  >=14 - Abnormal Female indicating possible myocardial injury.  >=22 - Abnormal Male indicating possible myocardial injury.   Clinicians would have to utilize clinical acumen, EKG, Troponin, and serial changes to determine if it is an Acute Myocardial Infarction or myocardial injury due to an underlying chronic condition.                 Imaging Results (Last 24 Hours)       Procedure Component Value Units Date/Time    XR Chest 1 View [[id number]] Collected: 04/01/24 0738     Updated: 04/01/24 0742    Narrative:      XR CHEST 1 VW    Date of Exam: 4/1/2024 5:23 AM EDT    Indication: Shortness of breath    Comparison: March 31, 2024, chest CT March 29, 2024.    Findings:  Lungs appear mildly hyperinflated consistent with emphysema as seen on prior chest CT.  No definite pleural effusion or pneumothorax. No definite new focal or diffuse infiltrate. Heart size appears within normal limits.      Impression:      Impression:  1.No radiographic findings of acute cardiopulmonary abnormality.  2.Emphysema.        Electronically Signed: Josiah Weston    4/1/2024 7:40 AM EDT    Workstation ID: [id number]            LAB RESULTS (LAST 7 DAYS)    CBC  Results from last 7 days   Lab Units 03/31/24  0343 03/30/24  0450   WBC 10*3/mm3 10.05 8.51   RBC 10*6/mm3 3.76* 3.96*   HEMOGLOBIN g/dL 10.0* 10.5*   HEMATOCRIT % 33.7* 36.0*   MCV fL 89.6 90.9   PLATELETS 10*3/mm3 247 241       BMP  Results from last 7 days   Lab Units 03/31/24  0343 03/30/24  1251 03/30/24  0450   SODIUM mmol/L 143 144 141   POTASSIUM mmol/L  4.2 4.2 4.5   CHLORIDE mmol/L 106 102 103   CO2 mmol/L 31.0* 33.0* 32.0*   BUN mg/dL 25* 23 19   CREATININE mg/dL 0.75* 0.85 0.75*   GLUCOSE mg/dL 121* 137* 116*   MAGNESIUM mg/dL 1.9 2.1 2.0       CMP   Results from last 7 days   Lab Units 03/31/24  0343 03/30/24  1251 03/30/24  0450   SODIUM mmol/L 143 144 141   POTASSIUM mmol/L 4.2 4.2 4.5   CHLORIDE mmol/L 106 102 103   CO2 mmol/L 31.0* 33.0* 32.0*   BUN mg/dL 25* 23 19   CREATININE mg/dL 0.75* 0.85 0.75*   GLUCOSE mg/dL 121* 137* 116*       BNP        TROPONIN  Results from last 7 days   Lab Units 04/01/24  1325   HSTROP T ng/L 14       CoAg        Creatinine Clearance  Estimated Creatinine Clearance: 89.4 mL/min (A) (by C-G formula based on SCr of 0.75 mg/dL (L)).    ABG  Results from last 7 days   Lab Units 03/29/24  1606   PH, ARTERIAL pH units 7.371   PCO2, ARTERIAL mm Hg 66.4*   PO2 ART mm Hg 110.1*   O2 SATURATION ART % 97.9   BASE EXCESS ART mmol/L 10.8*       Radiology  XR Chest 1 View    Result Date: 4/1/2024  Impression: 1.No radiographic findings of acute cardiopulmonary abnormality. 2.Emphysema. Electronically Signed: Josiah Weston  4/1/2024 7:40 AM EDT  Workstation ID: [id number]    XR Chest 1 View    Result Date: 3/31/2024  Impression: 1.No acute radiographic abnormality is identified. 2.Pulmonary emphysema. Electronically Signed: Santana Delgado MD  3/31/2024 11:51 AM EDT  Workstation ID: [id number]       EKG  I personally viewed and interpreted the patient's EKG/Telemetry data:  ECG 12 Lead Drug Monitoring; Amiodarone   Preliminary Result   HEART RATE= 73  bpm   RR Interval= 824  ms   WV Interval= 154  ms   P Horizontal Axis= -4  deg   P Front Axis= 34  deg   QRSD Interval= 86  ms   QT Interval= 398  ms   QTcB= 438  ms   QRS Axis= -52  deg   T Wave Axis= 62  deg   - ABNORMAL ECG -   Sinus rhythm   Left anterior fascicular block   Inferior infarct, old   When compared with ECG of 01-Apr-2024 20:58:47,   No significant change   Electronically Signed  By:    Date and Time of Study: 2024-04-02 05:29:01      ECG 12 Lead Drug Monitoring; Amiodarone   Preliminary Result   HEART RATE= 82  bpm   RR Interval= 728  ms   MA Interval= 139  ms   P Horizontal Axis= -6  deg   P Front Axis= 61  deg   QRSD Interval= 88  ms   QT Interval= 354  ms   QTcB= 415  ms   QRS Axis= -57  deg   T Wave Axis= 60  deg   - ABNORMAL ECG -   Sinus rhythm   Left anterior fascicular block   Inferior infarct, old   When compared with ECG of 01-Apr-2024 18:24:20,   Significant change in rhythm: previously atrial fibrillation   New or worsened ischemia or infarction   Significant repolarization change   Electronically Signed By:    Date and Time of Study: 2024-04-01 20:58:47      ECG 12 Lead Rhythm Change   Preliminary Result   HEART RATE= 175  bpm   RR Interval= 343  ms   MA Interval=   ms   P Horizontal Axis=   deg   P Front Axis=   deg   QRSD Interval= 88  ms   QT Interval= 274  ms   QTcB= 468  ms   QRS Axis= -65  deg   T Wave Axis= 162  deg   - ABNORMAL ECG -   Atrial fibrillation with rapid V-rate   Left anterior fascicular block   Repolarization abnormality, prob rate related   When compared with ECG of 01-Apr-2024 10:45:26,   Significant change in rhythm: previously sinus   Significant repolarization change   Electronically Signed By:    Date and Time of Study: 2024-04-01 18:24:20      ECG 12 Lead Chest Pain   Final Result   HEART RATE= 72  bpm   RR Interval= 836  ms   MA Interval= 132  ms   P Horizontal Axis= 0  deg   P Front Axis= 31  deg   QRSD Interval= 89  ms   QT Interval= 375  ms   QTcB= 410  ms   QRS Axis= -49  deg   T Wave Axis= 65  deg   - ABNORMAL ECG -   Sinus rhythm   Left anterior fascicular block   When compared with ECG of 29-Mar-2024 17:35:11,   No significant change   Electronically Signed By: Clinton Fisher ([facility]) 01-Apr-2024 17:44:01   Date and Time of Study: 2024-04-01 10:45:26      ECG 12 Lead Rhythm Change   Final Result   HEART RATE= 72  bpm   RR Interval= 836  ms   MA  Interval= 153  ms   P Horizontal Axis= 28  deg   P Front Axis= 40  deg   QRSD Interval= 90  ms   QT Interval= 353  ms   QTcB= 386  ms   QRS Axis= -39  deg   T Wave Axis= 64  deg   - OTHERWISE NORMAL ECG -   Sinus rhythm   Left axis deviation   No previous ECG available for comparison   Electronically Signed By: Clinton Fisher ([facility]) 30-Mar-2024 21:01:59   Date and Time of Study: 2024-03-29 17:35:11      Telemetry Scan   Final Result      Telemetry Scan   Final Result      Telemetry Scan   Final Result      Telemetry Scan   Final Result      Telemetry Scan   Final Result      Telemetry Scan   Final Result      Telemetry Scan   Final Result      Telemetry Scan   Final Result      Telemetry Scan   Final Result      Telemetry Scan   Final Result      Telemetry Scan   Final Result      Telemetry Scan   Final Result      Telemetry Scan   Final Result      Telemetry Scan   Final Result      Telemetry Scan   Final Result      Telemetry Scan   Final Result            Echocardiogram:    Results for orders placed during the hospital encounter of 03/29/24    Adult Transthoracic Echo Complete W/ Cont if Necessary Per Protocol    Interpretation Summary    Left ventricular systolic function is normal. Calculated left ventricular EF = 59% Left ventricular ejection fraction appears to be 56 - 60%.    Left ventricular diastolic function is consistent with (grade I) impaired relaxation.  GLS -15.3%.    Estimated right ventricular systolic pressure from tricuspid regurgitation is normal (<35 mmHg).    No significant valvular abnormalities noted.        Stress Test:         Cardiac Catheterization:  No results found for this or any previous visit.         Other:         ASSESSMENT & PLAN:    Principal Problem:    Respiratory failure    Atrial fibrillation  [id number] score is 4  Continue Eliquis for stroke prevention  A-fib RVR requiring amiodarone and Cardizem drips  Both drips will be stopped.  Start amiodarone 200 mg p.o. twice  daily.  Start Toprol-XL 25 mg p.o. twice daily  Holding off on Cardizem due to low blood pressure  RVR may have been precipitated by respiratory distress.      Elevated proBNP  Appears euvolemic  Echocardiogram shows preserved LV function, grade 1 diastolic dysfunction.  Recently received IV fluids due to rhabdomyolysis  IV Lasix will be switched to oral maintenance Lasix.    Rhabdomyolysis  Renal function has normalized.  IV fluids can be stopped     Hyperlipidemia  Start high intensity statin  LDL 80, HDL 34, triglyceride 113 and total cholesterol 135     COPD/pneumonia  Acute hypoxemic hypercapnic respiratory failure.  Currently on antibiotics, bronchodilators and steroids  Supplemental oxygen as needed  Discontinued Mucomyst due to possible reaction  Possible bronchoscopy if respiratory status does not improve    Chest pain  Negative troponin and no ECG changes  Continue to monitor.  Likely musculoskeletal discomfort from effort of breathing       History of pulmonary embolism in 2018  History of IVC filter which was removed in 2019     Obesity  BMI of 34   Lifestyle modifications recommended to the patient      Clinton Fisher MD  04/02/24  07:19 EDT

## 2024-04-02 NOTE — PLAN OF CARE
"Assessment: Orlando Velasquez presents with functional mobility impairments which indicate the need for skilled intervention. Tolerating session today without incident. Pt progressing well with PT staff this date as he was able to ambulate 30' CGA with RW this date. Fatigues quickly, exhibiting poor endurance, near the end of ambulation bout. Performed exercises sitting up in recliner following session and prescribed HEP to be completed 1x10, 3x/day. Will continue to follow and progress as tolerated.     Plan/Recommendations:   If medically appropriate, Moderate Intensity Therapy recommended post-acute care. This is recommended as therapy feels the patient would require 3-4 days per week and wouldn't tolerate \"3 hour daily\" rehab intensity. SNF would be the preferred choice. If the patient does not agree to SNF, arrange HH or OP depending on home bound status. If patient is medically complex, consider LTACH. Pt requires no DME at discharge.     Pt desires Skilled Rehab placement at discharge. Pt cooperative; agreeable to therapeutic recommendations and plan of care.   "

## 2024-04-02 NOTE — THERAPY TREATMENT NOTE
"Subjective: Pt agreeable to therapeutic plan of care.    Objective:     Bed mobility - N/A or Not attempted. Pt began and ended session sitting up in recliner.  Transfers - CGA and with rolling walker  Ambulation - 30 feet CGA and with rolling walker    Therapeutic Exercise - 10 Reps B LE AROM supported sitting / chair. Pt completed 1x10 of each of the following exercises sitting up in recliner: Seated Alternating Marching, LAQ, Heel Raises, and Toe Raises.    Vitals: WNL on 3L O2 throughout activity    Pain: 0 VAS   Location: N/A  Intervention for pain: N/A    Education: Provided education on the importance of mobility in the acute care setting, Verbal/Tactile Cues, Transfer Training, Gait Training, and Energy conservation strategies    Assessment: Orlando Velasquez presents with functional mobility impairments which indicate the need for skilled intervention. Tolerating session today without incident. Pt progressing well with PT staff this date as he was able to ambulate 30' CGA with RW this date. Fatigues quickly, exhibiting poor endurance, near the end of ambulation bout. Performed exercises sitting up in recliner following session and prescribed HEP to be completed 1x10, 3x/day. Will continue to follow and progress as tolerated.     Plan/Recommendations:   If medically appropriate, Moderate Intensity Therapy recommended post-acute care. This is recommended as therapy feels the patient would require 3-4 days per week and wouldn't tolerate \"3 hour daily\" rehab intensity. SNF would be the preferred choice. If the patient does not agree to SNF, arrange HH or OP depending on home bound status. If patient is medically complex, consider LTACH. Pt requires no DME at discharge.     Pt desires Skilled Rehab placement at discharge. Pt cooperative; agreeable to therapeutic recommendations and plan of care.         Basic Mobility 6-click:  Rollin = Total, A lot = 2, A little = 3; 4 = None  Supine>Sit:   1 = Total, " A lot = 2, A little = 3; 4 = None   Sit>Stand with arms:  1 = Total, A lot = 2, A little = 3; 4 = None  Bed>Chair:   1 = Total, A lot = 2, A little = 3; 4 = None  Ambulate in room:  1 = Total, A lot = 2, A little = 3; 4 = None  3-5 Steps with railin = Total, A lot = 2, A little = 3; 4 = None  Score: 17    Modified Juanpablo: N/A = No pre-op stroke/TIA    Post-Tx Position: Up in Chair, Alarms activated, and Call light and personal items within reach  PPE: gloves

## 2024-04-03 VITALS
HEIGHT: 64 IN | RESPIRATION RATE: 19 BRPM | OXYGEN SATURATION: 95 % | BODY MASS INDEX: 32.93 KG/M2 | SYSTOLIC BLOOD PRESSURE: 116 MMHG | TEMPERATURE: 97.6 F | WEIGHT: 192.9 LBS | HEART RATE: 77 BPM | DIASTOLIC BLOOD PRESSURE: 51 MMHG

## 2024-04-03 LAB
QT INTERVAL: 274 MS
QT INTERVAL: 354 MS
QT INTERVAL: 398 MS
QT INTERVAL: 447 MS
QTC INTERVAL: 415 MS
QTC INTERVAL: 438 MS
QTC INTERVAL: 468 MS
QTC INTERVAL: 473 MS

## 2024-04-03 PROCEDURE — 94761 N-INVAS EAR/PLS OXIMETRY MLT: CPT

## 2024-04-03 PROCEDURE — 25010000002 METHYLPREDNISOLONE PER 40 MG: Performed by: INTERNAL MEDICINE

## 2024-04-03 PROCEDURE — 93005 ELECTROCARDIOGRAM TRACING: CPT | Performed by: INTERNAL MEDICINE

## 2024-04-03 PROCEDURE — 94799 UNLISTED PULMONARY SVC/PX: CPT

## 2024-04-03 PROCEDURE — 99232 SBSQ HOSP IP/OBS MODERATE 35: CPT | Performed by: INTERNAL MEDICINE

## 2024-04-03 PROCEDURE — 94660 CPAP INITIATION&MGMT: CPT

## 2024-04-03 PROCEDURE — 94664 DEMO&/EVAL PT USE INHALER: CPT

## 2024-04-03 PROCEDURE — 93010 ELECTROCARDIOGRAM REPORT: CPT | Performed by: INTERNAL MEDICINE

## 2024-04-03 RX ORDER — CALCIUM CARBONATE 500 MG/1
2 TABLET, CHEWABLE ORAL 3 TIMES DAILY PRN
Qty: 60 TABLET | Refills: 0 | Status: SHIPPED | OUTPATIENT
Start: 2024-04-03

## 2024-04-03 RX ORDER — PANTOPRAZOLE SODIUM 40 MG/1
40 TABLET, DELAYED RELEASE ORAL
Qty: 30 TABLET | Refills: 0 | Status: SHIPPED | OUTPATIENT
Start: 2024-04-04

## 2024-04-03 RX ORDER — GUAIFENESIN 600 MG/1
600 TABLET, EXTENDED RELEASE ORAL EVERY 12 HOURS SCHEDULED
Qty: 60 TABLET | Refills: 0 | Status: SHIPPED | OUTPATIENT
Start: 2024-04-03

## 2024-04-03 RX ORDER — METOPROLOL SUCCINATE 25 MG/1
25 TABLET, EXTENDED RELEASE ORAL DAILY
Qty: 30 TABLET | Refills: 3 | Status: SHIPPED | OUTPATIENT
Start: 2024-04-04

## 2024-04-03 RX ORDER — PREDNISONE 20 MG/1
20 TABLET ORAL
Status: DISCONTINUED | OUTPATIENT
Start: 2024-04-04 | End: 2024-04-03 | Stop reason: HOSPADM

## 2024-04-03 RX ORDER — ACETAMINOPHEN 325 MG/1
650 TABLET ORAL EVERY 6 HOURS PRN
Status: DISCONTINUED | OUTPATIENT
Start: 2024-04-03 | End: 2024-04-03 | Stop reason: HOSPADM

## 2024-04-03 RX ORDER — ROFLUMILAST 250 UG/1
250 TABLET ORAL DAILY
Qty: 30 TABLET | Refills: 3 | Status: SHIPPED | OUTPATIENT
Start: 2024-04-04

## 2024-04-03 RX ORDER — BUDESONIDE 0.5 MG/2ML
1 INHALANT ORAL
Qty: 30 EACH | Refills: 0 | Status: SHIPPED | OUTPATIENT
Start: 2024-04-03

## 2024-04-03 RX ORDER — AMIODARONE HYDROCHLORIDE 200 MG/1
200 TABLET ORAL EVERY 12 HOURS SCHEDULED
Qty: 30 TABLET | Refills: 0 | Status: SHIPPED | OUTPATIENT
Start: 2024-04-03 | End: 2024-04-03 | Stop reason: HOSPADM

## 2024-04-03 RX ORDER — AMIODARONE HYDROCHLORIDE 200 MG/1
200 TABLET ORAL
Qty: 30 TABLET | Refills: 11 | Status: SHIPPED | OUTPATIENT
Start: 2024-04-04

## 2024-04-03 RX ORDER — AMIODARONE HYDROCHLORIDE 200 MG/1
200 TABLET ORAL
Status: DISCONTINUED | OUTPATIENT
Start: 2024-04-04 | End: 2024-04-03 | Stop reason: HOSPADM

## 2024-04-03 RX ORDER — FUROSEMIDE 40 MG/1
40 TABLET ORAL DAILY
Qty: 30 TABLET | Refills: 0 | Status: SHIPPED | OUTPATIENT
Start: 2024-04-04

## 2024-04-03 RX ADMIN — FUROSEMIDE 40 MG: 40 TABLET ORAL at 08:42

## 2024-04-03 RX ADMIN — METHYLPREDNISOLONE SODIUM SUCCINATE 40 MG: 40 INJECTION, POWDER, FOR SOLUTION INTRAMUSCULAR; INTRAVENOUS at 08:41

## 2024-04-03 RX ADMIN — PANTOPRAZOLE SODIUM 40 MG: 40 TABLET, DELAYED RELEASE ORAL at 05:30

## 2024-04-03 RX ADMIN — IPRATROPIUM BROMIDE 0.5 MG: 0.5 SOLUTION RESPIRATORY (INHALATION) at 08:06

## 2024-04-03 RX ADMIN — BUDESONIDE 1 MG: 0.5 INHALANT RESPIRATORY (INHALATION) at 08:12

## 2024-04-03 RX ADMIN — GUAIFENESIN 600 MG: 600 TABLET, EXTENDED RELEASE ORAL at 08:41

## 2024-04-03 RX ADMIN — APIXABAN 5 MG: 5 TABLET, FILM COATED ORAL at 08:41

## 2024-04-03 RX ADMIN — IPRATROPIUM BROMIDE AND ALBUTEROL SULFATE 3 ML: .5; 3 SOLUTION RESPIRATORY (INHALATION) at 02:16

## 2024-04-03 RX ADMIN — AMIODARONE HYDROCHLORIDE 200 MG: 200 TABLET ORAL at 08:41

## 2024-04-03 RX ADMIN — Medication 10 ML: at 08:44

## 2024-04-03 RX ADMIN — IPRATROPIUM BROMIDE 0.5 MG: 0.5 SOLUTION RESPIRATORY (INHALATION) at 11:01

## 2024-04-03 RX ADMIN — ROFLUMILAST 250 MCG: 500 TABLET ORAL at 08:41

## 2024-04-03 RX ADMIN — METOPROLOL SUCCINATE 25 MG: 25 TABLET, FILM COATED, EXTENDED RELEASE ORAL at 08:41

## 2024-04-03 NOTE — PROGRESS NOTES
[facility] MEDICINE SERVICE  DAILY PROGRESS NOTE    NAME: Orlando Velasquez  : 1950  MRN: [id number]      LOS: 5 days     PROVIDER OF SERVICE: Roby Stacy MD    Chief Complaint: Respiratory failure    Subjective:     Interval History:  History taken from: patient    Subjective       Chief Complaint: shortness of air     History of Present Illness: Orlando Velasquez is a 73 y.o. male with a CMH of hypertension, hyperlipidemia, COPD, chronic respiratory failure presenting from outside facility with difficulty breathing.  He was being treated for pneumonia and COPD exacerbation and acute hypoxic hypercapnic respiratory failure.  He was kept on BiPAP intermittently.  Patient had CT scan done at outside facility which showed possible mucous plugging and he was transferred here for possible pulmonology consult and bronchoscopy.  As of right now patient is on 4 L of supplemental oxygen, at baseline he wears 3 L at home.  Patient also fell down a few days ago and has bruising on his face patient was also diagnosed with rhabdomyolysis and was being treated with IV fluids at outside facility.     3/30/24 patient seen and examined as sitting in recliner, dyspnea on 4 L of oxygen, fatigue and weakness, family at bedside, discussed with RN.  3/31/2024 patient sitting in recliner, family at bedside, dyspnea on 4 L, Multiple recurrent episode of respiratory distress after Mucomyst improved within half an hour   Feeling shaking and tremors, less coughing no nausea or vomiting, no change in bowel habit, no dysuria,  no new  skin rash or itching.  2024 patient seen and examined in bed no acute distress, vital signs stable, dyspnea on 3 L.  Family at bedside, discussed with RN.  Patient with weakness fatigue.  OT recommends SNF at [place] and will follow.   24 patient seen today in bed NAD, dyspnea on 3 lts, amiodarone changed to po, DW RN  4/3/2024 patient seen and examined in bed no acute distress, doing  better today, will discharge to nursing home.  Condition at discharge stable.  Discussed with RN.     Review of Systems  Respiratory:  Positive for cough and shortness of breath.    Cardiovascular:  Negative for chest pain.   Objective:     Vital Signs  Temp:  [96.9 °F (36.1 °C)-97.8 °F (36.6 °C)] 97.6 °F (36.4 °C)  Heart Rate:  [64-83] 77  Resp:  [13-25] 19  BP: ()/(51-69) 116/51  Flow (L/min):  [3] 3   Body mass index is 33.11 kg/m².    Physical Exam  Constitutional:       General: He is not in acute distress.     Appearance: He is well-developed. He is not diaphoretic.   HENT:      Head: Normocephalic and atraumatic.   Cardiovascular:      Rate and Rhythm: Normal rate.   Pulmonary:      Effort: Pulmonary effort is normal. Respiratory distress present.      Breath sounds: No wheezing.   Abdominal:      General: There is no distension.      Palpations: Abdomen is soft.   Musculoskeletal:         General: Normal range of motion.   Skin:     General: Skin is warm and dry.   Neurological:      Mental Status: He is alert.      Cranial Nerves: No cranial nerve deficit.   Psychiatric:         Behavior: Behavior normal.         Thought Content: Thought content normal.         Judgment: Judgment normal.      Scheduled Meds   [START ON 4/4/2024] amiodarone, 200 mg, Oral, Q24H  apixaban, 5 mg, Oral, BID  budesonide, 1 mg, Nebulization, BID - RT  furosemide, 40 mg, Oral, Daily  guaiFENesin, 600 mg, Oral, Q12H  ipratropium, 0.5 mg, Nebulization, 4x Daily - RT  metoprolol succinate XL, 25 mg, Oral, Daily  pantoprazole, 40 mg, Oral, Q AM  [START ON 4/4/2024] predniSONE, 20 mg, Oral, Daily With Breakfast  roflumilast, 250 mcg, Oral, Daily  sodium chloride, 10 mL, Intravenous, Q12H       PRN Meds     acetaminophen    ALPRAZolam    calcium carbonate    diphenhydrAMINE    ipratropium-albuterol    ondansetron    sodium chloride    sodium chloride    traZODone   Infusions           Diagnostic Data    Results from last 7 days    Lab Units 03/31/24  0343   WBC 10*3/mm3 10.05   HEMOGLOBIN g/dL 10.0*   HEMATOCRIT % 33.7*   PLATELETS 10*3/mm3 247   GLUCOSE mg/dL 121*   CREATININE mg/dL 0.75*   BUN mg/dL 25*   SODIUM mmol/L 143   POTASSIUM mmol/L 4.2   ANION GAP mmol/L 6.0       No radiology results for the last day      I reviewed the patient's new clinical results.    Assessment/Plan:     Active and Resolved Problems  Active Hospital Problems    Diagnosis  POA    **Respiratory failure [J96.90]  Yes      Resolved Hospital Problems   No resolved problems to display.     Acute on chronic hypoxic hypercapnic respiratory failure- COPD/pneumonia  Acute hypoxemic hypercapnic respiratory failure.  Currently on antibiotics, bronchodilators and steroids  Supplemental oxygen as needed  Discontinued Mucomyst due to possible reaction  Possible bronchoscopy if respiratory status does not improve  -Oxygen titration  -Brovana budesonide DuoNeb  -Mucinex,   -BiPAP as needed,   -pulmonology consulted     Pneumonia-IV antibiotics have been started, cultures will be ordered, continue to monitor     Atrial fibrillation-cardiology consult, we will continue to monitor heart rate,   [id number] score is 4  Continue Eliquis for stroke prevention  A-fib RVR requiring amiodarone and Cardizem drips  Both drips will be stopped.  Start amiodarone 200 mg p.o. twice daily.  Start Toprol-XL 25 mg p.o. twice daily  Holding off on Cardizem due to low blood pressure  RVR may have been precipitated by respiratory distress.      Elevated proBNP  Appears euvolemic  Echocardiogram shows preserved LV function, grade 1 diastolic dysfunction.  Recently received IV fluids due to rhabdomyolysis  IV Lasix will be switched to oral maintenance Lasix.     Rhabdomyolysis- dc IV fluids continue monitoring  Renal function has normalized.  IV fluids can be stopped     Hypertension-continue monitoring blood pressure    Chest pain  Negative troponin and no ECG changes  Continue to  monitor.  Likely musculoskeletal discomfort from effort of breathing     DVT prophylaxis:-SCD  Medical and mechanical DVT prophylaxis orders are present.    Code status is   Code Status and Medical Interventions:   Ordered at: 03/29/24 1533     Code Status (Patient has no pulse and is not breathing):    CPR (Attempt to Resuscitate)     Medical Interventions (Patient has pulse or is breathing):    Full Support     Plan for disposition:nh in 3 days    Time: 30 minutes    Signature: Electronically signed by Roby Stacy MD, 04/03/24, 12:38 EDT.  [facility] Hospitalist Team

## 2024-04-03 NOTE — CASE MANAGEMENT/SOCIAL WORK
Continued Stay Note  CONCHIS Zaid     Patient Name: Orlando Velasquez  MRN: [id number]  Today's Date: 4/3/2024    Admit Date: 3/29/2024    Plan: [facility]. No precert required. PASRR approved. From home with spouse. Current home O2 (3L) through [facility].   Discharge Plan       Row Name 04/03/24 1202       Plan    Patient/Family in Agreement with Plan yes    Plan Comments [facility] liaison, Aydee HUA to inquire bipap status at facility. Liaison confirmed bipap has arrived. CM met with patient, spouse, and daughter at bedside to discuss dc planning and IMM letter. They were agreeable to d/c plan at this time. Confirmed with liaison that they could pick patient up via their facility wheelchair van at 1pm. RN confirmed patient would be ready at that time.    Final Discharge Disposition Code 03 - skilled nursing facility (SNF)    Final Note [facility]                  Expected Discharge Date and Time       Expected Discharge Date Expected Discharge Time    Apr 3, 2024         Case Management Discharge Note  Final Note: [facility]  Selected Continued Care - Admitted Since 3/29/2024       Destination Coordination complete.      Service Provider Selected Services Address Phone Fax Patient Preferred    [facility] [address] [place] [address] [phone]            Transportation Services  W/C Van: Skilled Nursing Facility Van  Final Discharge Disposition Code: 03 - skilled nursing facility (SNF)     Palma Esteban RN     Office Phone: [phone]  Office Cell: [phone]

## 2024-04-03 NOTE — PROGRESS NOTES
Referring Provider: Roby Stacy MD    Reason for follow-up: Atrial fibrillation     Patient Care Team:  Jagdeep Dailey DO as PCP - General (Internal Medicine)      SUBJECTIVE  He has remained in sinus rhythm.  Respiratory status is back to baseline.     ROS  Review of all systems negative except as indicated.    Since I have last seen, the patient has been without any chest discomfort, shortness of breath, palpitations, dizziness or syncope.  Denies having any headache, abdominal pain, nausea, vomiting, diarrhea, constipation, loss of weight or loss of appetite.  Denies having any excessive bruising, hematuria or blood in the stool.  ROS      Personal History:    Past Medical History:   Diagnosis Date    A-fib     Arthritis     Asthma     Cancer     COPD (chronic obstructive pulmonary disease)     DVT (deep venous thrombosis)     GERD (gastroesophageal reflux disease)     Hernia, abdominal     History of transfusion     PE (pulmonary thromboembolism)     Presence of IVC filter        History reviewed. No pertinent surgical history.    History reviewed. No pertinent family history.    Social History     Tobacco Use    Smoking status: Former     Current packs/day: 0.00     Types: Cigarettes     Quit date:      Years since quittin.2     Passive exposure: Past    Smokeless tobacco: Never   Vaping Use    Vaping status: Never Used   Substance Use Topics    Alcohol use: Never    Drug use: Never        Home meds:  Prior to Admission medications    Medication Sig Start Date End Date Taking? Authorizing Provider   ALPRAZolam (XANAX) 1 MG tablet Take 1 tablet by mouth 3 (Three) Times a Day As Needed for Anxiety.    ProviderLeonora MD   apixaban (ELIQUIS) 5 MG tablet tablet Take 1 tablet by mouth 2 (Two) Times a Day.    Leonora Davis MD   furosemide (LASIX) 40 MG tablet Take 1 tablet by mouth Daily.    Leonora Davis MD   omeprazole (priLOSEC) 40 MG capsule Take 1 capsule by mouth  "Daily.    ProviderLeonora MD   predniSONE (DELTASONE) 5 MG tablet Take 1 tablet by mouth Daily.    ProviderLeonora MD   traZODone (DESYREL) 50 MG tablet Take 1 tablet by mouth 3 (Three) Times a Day.    ProviderLeonora MD       Allergies:  Patient has no known allergies.    Scheduled Meds:amiodarone, 200 mg, Oral, Q12H  apixaban, 5 mg, Oral, BID  budesonide, 1 mg, Nebulization, BID - RT  cefTRIAXone, 2,000 mg, Intravenous, Q24H  furosemide, 40 mg, Oral, Daily  guaiFENesin, 600 mg, Oral, Q12H  ipratropium, 0.5 mg, Nebulization, 4x Daily - RT  methylPREDNISolone sodium succinate, 40 mg, Intravenous, Q24H  metoprolol succinate XL, 25 mg, Oral, Daily  pantoprazole, 40 mg, Oral, Q AM  roflumilast, 250 mcg, Oral, Daily  sodium chloride, 10 mL, Intravenous, Q12H      Continuous Infusions:     PRN Meds:.  acetaminophen    ALPRAZolam    calcium carbonate    diphenhydrAMINE    ipratropium-albuterol    ondansetron    sodium chloride    sodium chloride    traZODone      OBJECTIVE    Vital Signs  Vitals:    04/03/24 0100 04/03/24 0216 04/03/24 0221 04/03/24 0600   BP: 112/63   98/53   BP Location: Right arm   Right arm   Patient Position: Lying   Lying   Pulse: 71 69 67 68   Resp: 19 17 16 15   Temp: 96.9 °F (36.1 °C)   97.5 °F (36.4 °C)   TempSrc: Axillary   Axillary   SpO2: 95% 96% 97% 98%   Weight:    87.5 kg (192 lb 14.4 oz)   Height:           Flowsheet Rows      Flowsheet Row First Filed Value   Admission Height 162.6 cm (64\") Documented at 03/29/2024 1640   Admission Weight 92.3 kg (203 lb 7.8 oz) Documented at 03/29/2024 1500              Intake/Output Summary (Last 24 hours) at 4/3/2024 0753  Last data filed at 4/3/2024 0700  Gross per 24 hour   Intake 920 ml   Output 400 ml   Net 520 ml          Telemetry: Sinus rhythm    Physical Exam:  The patient is alert, oriented and in mild respiratory distress  Vital signs as noted above.  Head and neck revealed no carotid bruits or jugular venous distention.  " No thyromegaly or lymphadenopathy is present  Lungs clear.  No wheezing.  Breath sounds are normal bilaterally.  Heart normal first and second heart sounds.  No murmur. No precordial rub is present.  No gallop is present.  Abdomen soft and nontender.  No organomegaly is present.  Extremities with good peripheral pulses without any pedal edema.  Skin warm and dry.  Musculoskeletal system is grossly normal.  CNS grossly normal.       Results Review:  I have personally reviewed the results from the time of this admission to 4/3/2024 07:53 EDT and agree with these findings:  []  Laboratory  []  Microbiology  []  Radiology  []  EKG/Telemetry   []  Cardiology/Vascular   []  Pathology  []  Old records  []  Other:    Most notable findings include:    Lab Results (last 24 hours)       Procedure Component Value Units Date/Time    Blood Culture - Blood, Hand, Left [[id number]]  (Normal) Collected: 03/30/24 1250    Specimen: Blood from Hand, Left Updated: 04/02/24 1300     Blood Culture No growth at 3 days    Blood Culture - Blood, Arm, Right [[id number]]  (Normal) Collected: 03/30/24 1251    Specimen: Blood from Arm, Right Updated: 04/02/24 1300     Blood Culture No growth at 3 days            Imaging Results (Last 24 Hours)       ** No results found for the last 24 hours. **            LAB RESULTS (LAST 7 DAYS)    CBC  Results from last 7 days   Lab Units 03/31/24  0343 03/30/24  0450   WBC 10*3/mm3 10.05 8.51   RBC 10*6/mm3 3.76* 3.96*   HEMOGLOBIN g/dL 10.0* 10.5*   HEMATOCRIT % 33.7* 36.0*   MCV fL 89.6 90.9   PLATELETS 10*3/mm3 247 241       BMP  Results from last 7 days   Lab Units 03/31/24  0343 03/30/24  1251 03/30/24  0450   SODIUM mmol/L 143 144 141   POTASSIUM mmol/L 4.2 4.2 4.5   CHLORIDE mmol/L 106 102 103   CO2 mmol/L 31.0* 33.0* 32.0*   BUN mg/dL 25* 23 19   CREATININE mg/dL 0.75* 0.85 0.75*   GLUCOSE mg/dL 121* 137* 116*   MAGNESIUM mg/dL 1.9 2.1 2.0       CMP   Results from last 7 days   Lab Units 03/31/24  4356  03/30/24  1251 03/30/24  0450   SODIUM mmol/L 143 144 141   POTASSIUM mmol/L 4.2 4.2 4.5   CHLORIDE mmol/L 106 102 103   CO2 mmol/L 31.0* 33.0* 32.0*   BUN mg/dL 25* 23 19   CREATININE mg/dL 0.75* 0.85 0.75*   GLUCOSE mg/dL 121* 137* 116*       BNP        TROPONIN  Results from last 7 days   Lab Units 04/01/24  1325   HSTROP T ng/L 14       CoAg        Creatinine Clearance  Estimated Creatinine Clearance: 88.8 mL/min (A) (by C-G formula based on SCr of 0.75 mg/dL (L)).    ABG  Results from last 7 days   Lab Units 03/29/24  1606   PH, ARTERIAL pH units 7.371   PCO2, ARTERIAL mm Hg 66.4*   PO2 ART mm Hg 110.1*   O2 SATURATION ART % 97.9   BASE EXCESS ART mmol/L 10.8*       Radiology  No radiology results for the last day      EKG  I personally viewed and interpreted the patient's EKG/Telemetry data:  ECG 12 Lead Drug Monitoring; Amiodarone   Preliminary Result   HEART RATE= 67  bpm   RR Interval= 892  ms   OK Interval= 138  ms   P Horizontal Axis= 21  deg   P Front Axis= 42  deg   QRSD Interval= 102  ms   QT Interval= 447  ms   QTcB= 473  ms   QRS Axis= -54  deg   T Wave Axis= 41  deg   - ABNORMAL ECG -   Sinus rhythm   LAD, consider left anterior fascicular block   Borderline ST elevation, anterolateral leads   Electronically Signed By:    Date and Time of Study: 2024-04-03 05:53:56      ECG 12 Lead Drug Monitoring; Amiodarone   Preliminary Result   HEART RATE= 73  bpm   RR Interval= 824  ms   OK Interval= 154  ms   P Horizontal Axis= -4  deg   P Front Axis= 34  deg   QRSD Interval= 86  ms   QT Interval= 398  ms   QTcB= 438  ms   QRS Axis= -52  deg   T Wave Axis= 62  deg   - ABNORMAL ECG -   Sinus rhythm   Left anterior fascicular block   Inferior infarct, old   When compared with ECG of 01-Apr-2024 20:58:47,   No significant change   Electronically Signed By:    Date and Time of Study: 2024-04-02 05:29:01      ECG 12 Lead Drug Monitoring; Amiodarone   Preliminary Result   HEART RATE= 82  bpm   RR Interval= 728  ms    MI Interval= 139  ms   P Horizontal Axis= -6  deg   P Front Axis= 61  deg   QRSD Interval= 88  ms   QT Interval= 354  ms   QTcB= 415  ms   QRS Axis= -57  deg   T Wave Axis= 60  deg   - ABNORMAL ECG -   Sinus rhythm   Left anterior fascicular block   Inferior infarct, old   When compared with ECG of 01-Apr-2024 18:24:20,   Significant change in rhythm: previously atrial fibrillation   New or worsened ischemia or infarction   Significant repolarization change   Electronically Signed By:    Date and Time of Study: 2024-04-01 20:58:47      ECG 12 Lead Rhythm Change   Preliminary Result   HEART RATE= 175  bpm   RR Interval= 343  ms   MI Interval=   ms   P Horizontal Axis=   deg   P Front Axis=   deg   QRSD Interval= 88  ms   QT Interval= 274  ms   QTcB= 468  ms   QRS Axis= -65  deg   T Wave Axis= 162  deg   - ABNORMAL ECG -   Atrial fibrillation with rapid V-rate   Left anterior fascicular block   Repolarization abnormality, prob rate related   When compared with ECG of 01-Apr-2024 10:45:26,   Significant change in rhythm: previously sinus   Significant repolarization change   Electronically Signed By:    Date and Time of Study: 2024-04-01 18:24:20      ECG 12 Lead Chest Pain   Final Result   HEART RATE= 72  bpm   RR Interval= 836  ms   MI Interval= 132  ms   P Horizontal Axis= 0  deg   P Front Axis= 31  deg   QRSD Interval= 89  ms   QT Interval= 375  ms   QTcB= 410  ms   QRS Axis= -49  deg   T Wave Axis= 65  deg   - ABNORMAL ECG -   Sinus rhythm   Left anterior fascicular block   When compared with ECG of 29-Mar-2024 17:35:11,   No significant change   Electronically Signed By: Clinton Fisher ([facility]) 01-Apr-2024 17:44:01   Date and Time of Study: 2024-04-01 10:45:26      ECG 12 Lead Rhythm Change   Final Result   HEART RATE= 72  bpm   RR Interval= 836  ms   MI Interval= 153  ms   P Horizontal Axis= 28  deg   P Front Axis= 40  deg   QRSD Interval= 90  ms   QT Interval= 353  ms   QTcB= 386  ms   QRS Axis= -39  deg   T Wave  Axis= 64  deg   - OTHERWISE NORMAL ECG -   Sinus rhythm   Left axis deviation   No previous ECG available for comparison   Electronically Signed By: Clinton Fisher ([facility]) 30-Mar-2024 21:01:59   Date and Time of Study: 2024-03-29 17:35:11      Telemetry Scan   Final Result      Telemetry Scan   Final Result      Telemetry Scan   Final Result      Telemetry Scan   Final Result      Telemetry Scan   Final Result      Telemetry Scan   Final Result      Telemetry Scan   Final Result      Telemetry Scan   Final Result      Telemetry Scan   Final Result      Telemetry Scan   Final Result      Telemetry Scan   Final Result      Telemetry Scan   Final Result      Telemetry Scan   Final Result      Telemetry Scan   Final Result      Telemetry Scan   Final Result      Telemetry Scan   Final Result      Telemetry Scan   Final Result      Telemetry Scan   Final Result      Telemetry Scan   Final Result      Telemetry Scan   Final Result            Echocardiogram:    Results for orders placed during the hospital encounter of 03/29/24    Adult Transthoracic Echo Complete W/ Cont if Necessary Per Protocol    Interpretation Summary    Left ventricular systolic function is normal. Calculated left ventricular EF = 59% Left ventricular ejection fraction appears to be 56 - 60%.    Left ventricular diastolic function is consistent with (grade I) impaired relaxation.  GLS -15.3%.    Estimated right ventricular systolic pressure from tricuspid regurgitation is normal (<35 mmHg).    No significant valvular abnormalities noted.        Stress Test:         Cardiac Catheterization:  No results found for this or any previous visit.         Other:         ASSESSMENT & PLAN:    Principal Problem:    Respiratory failure    Atrial fibrillation  [id number] score is 4  Continue Eliquis for stroke prevention  A-fib RVR requiring amiodarone and Cardizem drips  Continue amiodarone for rhythm control and Toprol-XL for rate control  Explained the rationale  for both medications to the family.  Holding off on Cardizem due to low blood pressure  RVR may have been precipitated by respiratory distress.      Elevated proBNP  Appears euvolemic  Echocardiogram shows preserved LV function, grade 1 diastolic dysfunction.  Recently received IV fluids due to rhabdomyolysis  IV Lasix will be switched to oral maintenance Lasix.    Rhabdomyolysis  Renal function has normalized.  IV fluids can be stopped  Creatinine is normal     Hyperlipidemia  Start high intensity statin  LDL 80, HDL 34, triglyceride 113 and total cholesterol 135     COPD/pneumonia  Acute hypoxemic hypercapnic respiratory failure.  Currently on antibiotics, bronchodilators and steroids  Supplemental oxygen as needed  Discontinued Mucomyst due to possible reaction  Possible bronchoscopy if respiratory status does not improve    Chest pain  Negative troponin and no ECG changes  Continue to monitor.  Likely musculoskeletal discomfort from effort of breathing       History of pulmonary embolism in 2018  History of IVC filter which was removed in 2019     Obesity  BMI of 33  Lifestyle modifications recommended to the patient      Clinton Fisher MD  04/03/24  07:53 EDT

## 2024-04-03 NOTE — DISCHARGE SUMMARY
[facility]  Discharge Summary    Date of Service: 4/3/24  Patient Name: Orlando Velasquez  : 1950  MRN: [id number]    Date of Admission: 3/29/2024  Discharge Diagnosis: Acute on chronic hypoxic hypercapnic respiratory failure- COPD/pneumonia  Date of Discharge:  4/3/24  Primary Care Physician: Jagdeep Dailey DO      Presenting Problem:   Respiratory failure [J96.90]    Active and Resolved Hospital Problems:  Active Hospital Problems    Diagnosis POA    **Respiratory failure [J96.90] Yes      Resolved Hospital Problems   No resolved problems to display.       Acute on chronic hypoxic hypercapnic respiratory failure- COPD/pneumonia  Acute hypoxemic hypercapnic respiratory failure.  Currently on antibiotics, bronchodilators and steroids  Supplemental oxygen as needed  Discontinued Mucomyst due to possible reaction  Possible bronchoscopy if respiratory status does not improve  -Oxygen titration  -Brovana budesonide DuoNeb  -Mucinex,   -BiPAP as needed,   -pulmonology consulted     Pneumonia-  -IV antibiotics   -cultures will be ordered,   -continue to monitor     Atrial fibrillation-cardiology consult, we will continue to monitor heart rate,   [id number] score is 4  Continue Eliquis for stroke prevention  A-fib RVR requiring amiodarone and Cardizem drips  Both drips will be stopped.  Start amiodarone 200 mg p.o. twice daily.  Start Toprol-XL 25 mg p.o. twice daily  Holding off on Cardizem due to low blood pressure  RVR may have been precipitated by respiratory distress.       Elevated proBNP  Appears euvolemic  Echocardiogram shows preserved LV function, grade 1 diastolic dysfunction.  Recently received IV fluids due to rhabdomyolysis  IV Lasix will be switched to oral maintenance Lasix.     Rhabdomyolysis- dc IV fluids continue monitoring  Renal function has normalized.  IV fluids can be stopped     Hypertension-continue monitoring blood pressure     Chest  pain  Negative troponin and no ECG changes  Continue to monitor.  Likely musculoskeletal discomfort from effort of breathing    Hospital Course     History of Present Illness: Orlando Velasquez is a 73 y.o. male with a CMH of hypertension, hyperlipidemia, COPD, chronic respiratory failure presenting from outside facility with difficulty breathing.  He was being treated for pneumonia and COPD exacerbation and acute hypoxic hypercapnic respiratory failure.  He was kept on BiPAP intermittently.  Patient had CT scan done at outside facility which showed possible mucous plugging and he was transferred here for possible pulmonology consult and bronchoscopy.  As of right now patient is on 4 L of supplemental oxygen, at baseline he wears 3 L at home.  Patient also fell down a few days ago and has bruising on his face patient was also diagnosed with rhabdomyolysis and was being treated with IV fluids at outside facility.     3/30/24 patient seen and examined as sitting in recliner, dyspnea on 4 L of oxygen, fatigue and weakness, family at bedside, discussed with RN.  3/31/2024 patient sitting in recliner, family at bedside, dyspnea on 4 L, Multiple recurrent episode of respiratory distress after Mucomyst improved within half an hour   Feeling shaking and tremors, less coughing no nausea or vomiting, no change in bowel habit, no dysuria,  no new  skin rash or itching.  4/1/2024 patient seen and examined in bed no acute distress, vital signs stable, dyspnea on 3 L.  Family at bedside, discussed with RN.  Patient with weakness fatigue.  OT recommends SNF at [place] and will follow.   4/2/24 patient seen today in bed NAD, dyspnea on 3 lts, amiodarone changed to po, DW RN  4/3/2024 patient seen and examined in bed no acute distress, doing better today, will discharge to nursing home.  Condition at discharge stable.  Discussed with RN.    DISCHARGE Follow Up Recommendations for labs and diagnostics: follow with pcp in one  week      Reasons For Change In Medications and Indications for New Medications:  START taking:  amiodarone (PACERONE)   budesonide (PULMICORT)   calcium carbonate (TUMS)   guaiFENesin (MUCINEX)   ipratropium (ATROVENT)   metoprolol succinate XL (TOPROL-XL)   pantoprazole (PROTONIX)   roflumilast (DALIRESP)    CHANGE how you take:  furosemide (LASIX) -- medication strength, how much to take   STOP taking:  budesonide-formoterol 80-4.5 MCG/ACT inhaler (SYMBICORT)   omeprazole 40 MG capsule (priLOSEC)   predniSONE 5 MG tablet (DELTASONE)     Day of Discharge     Vital Signs:  Temp:  [96.9 °F (36.1 °C)-97.8 °F (36.6 °C)] 97.6 °F (36.4 °C)  Heart Rate:  [64-83] 74  Resp:  [13-25] 21  BP: ()/(51-69) 116/51  Flow (L/min):  [3] 3      Physical Exam      General: He is not in acute distress.     Appearance: He is well-developed. He is not diaphoretic.   HENT:      Head: Normocephalic and atraumatic.   Cardiovascular:      Rate and Rhythm: Normal rate.   Pulmonary:      Effort: Pulmonary effort is normal. Respiratory distress present.      Breath sounds: No wheezing.   Abdominal:      General: There is no distension.      Palpations: Abdomen is soft.   Musculoskeletal:         General: Normal range of motion.   Skin:     General: Skin is warm and dry.   Neurological:      Mental Status: He is alert.      Cranial Nerves: No cranial nerve deficit.   Psychiatric:         Behavior: Behavior normal.         Thought Content: Thought content normal.         Judgment: Judgment normal.       Pertinent  and/or Most Recent Results     LAB RESULTS:      Lab 03/31/24  0343 03/30/24  0450   WBC 10.05 8.51   HEMOGLOBIN 10.0* 10.5*   HEMATOCRIT 33.7* 36.0*   PLATELETS 247 241   NEUTROS ABS 9.18* 7.83*   IMMATURE GRANS (ABS) 0.25*  --    LYMPHS ABS 0.31*  --    MONOS ABS 0.29  --    EOS ABS 0.00 0.09   MCV 89.6 90.9   LACTATE  --  1.5         Lab 03/31/24  0343 03/30/24  1251 03/30/24  0450   SODIUM 143 144 141   POTASSIUM 4.2 4.2 4.5    CHLORIDE 106 102 103   CO2 31.0* 33.0* 32.0*   ANION GAP 6.0 9.0 6.0   BUN 25* 23 19   CREATININE 0.75* 0.85 0.75*   EGFR 95.9 92.3 95.9   GLUCOSE 121* 137* 116*   CALCIUM 8.3* 8.9 8.2*   MAGNESIUM 1.9 2.1 2.0             Lab 04/01/24  1325 04/01/24  1105 03/31/24  0343   PROBNP  --   --  2,225.0*   HSTROP T 14 20  --          Lab 03/30/24  0450   CHOLESTEROL 135   LDL CHOL 80   HDL CHOL 34*   TRIGLYCERIDES 113             Lab 03/29/24  1606   PH, ARTERIAL 7.371   PCO2, ARTERIAL 66.4*   PO2 .1*   O2 SATURATION ART 97.9   FIO2 36   HCO3 ART 38.5*   BASE EXCESS ART 10.8*     Brief Urine Lab Results       None          Microbiology Results (last 10 days)       Procedure Component Value - Date/Time    Blood Culture - Blood, Arm, Right [[id number]]  (Normal) Collected: 03/30/24 1251    Lab Status: Preliminary result Specimen: Blood from Arm, Right Updated: 04/02/24 1300     Blood Culture No growth at 3 days    Blood Culture - Blood, Hand, Left [[id number]]  (Normal) Collected: 03/30/24 1250    Lab Status: Preliminary result Specimen: Blood from Hand, Left Updated: 04/02/24 1300     Blood Culture No growth at 3 days            XR Chest 1 View    Result Date: 4/1/2024  Impression: Impression: 1.No radiographic findings of acute cardiopulmonary abnormality. 2.Emphysema. Electronically Signed: Josiah Weston  4/1/2024 7:40 AM EDT  Workstation ID: [id number]    XR Chest 1 View    Result Date: 3/31/2024  Impression: Impression: 1.No acute radiographic abnormality is identified. 2.Pulmonary emphysema. Electronically Signed: Santana Delgado MD  3/31/2024 11:51 AM EDT  Workstation ID: [id number]             Results for orders placed during the hospital encounter of 03/29/24    Adult Transthoracic Echo Complete W/ Cont if Necessary Per Protocol    Interpretation Summary    Left ventricular systolic function is normal. Calculated left ventricular EF = 59% Left ventricular ejection fraction appears to be 56 - 60%.    Left  ventricular diastolic function is consistent with (grade I) impaired relaxation.  GLS -15.3%.    Estimated right ventricular systolic pressure from tricuspid regurgitation is normal (<35 mmHg).    No significant valvular abnormalities noted.      Labs Pending at Discharge:  Pending Labs       Order Current Status    Blood Culture - Blood, Arm, Right Preliminary result    Blood Culture - Blood, Hand, Left Preliminary result            Procedures Performed           Consults:   Consults       Date and Time Order Name Status Description    3/29/2024  3:33 PM Inpatient Cardiology Consult Completed     3/29/2024  3:33 PM Inpatient Pulmonology Consult Completed              Assessment     Acute respiratory distress   Pneumonia   COPD   Asthma   Idiopathic scoliosis   A-fib   Hx PE/DVT   GERD   Anemia   RLS   Panic attacks/Anxiety      Results for orders placed during the hospital encounter of 03/29/24     Adult Transthoracic Echo Complete W/ Cont if Necessary Per Protocol     Interpretation Summary    Left ventricular systolic function is normal. Calculated left ventricular EF = 59% Left ventricular ejection fraction appears to be 56 - 60%.    Left ventricular diastolic function is consistent with (grade I) impaired relaxation.  GLS -15.3%.    Estimated right ventricular systolic pressure from tricuspid regurgitation is normal (<35 mmHg).    No significant valvular abnormalities noted.        Plan:  Oxygen supplement and titration to maintain saturation 90-95%: Currently on 3 L per nasal cannula  Inhaled corticosteroids  IV steroids: Changed to prednisone 20 mg for 5 days  Daliresp  Mucinex  Incentive spirometry  HR/BP control as per cardiology  Diuresis  Anticoagulation: Eliquis           ASSESSMENT & PLAN:     Principal Problem:    Respiratory failure     Atrial fibrillation  [id number] score is 4  Continue Eliquis for stroke prevention  A-fib RVR requiring amiodarone and Cardizem drips  Continue amiodarone for rhythm  control and Toprol-XL for rate control  Explained the rationale for both medications to the family.  Holding off on Cardizem due to low blood pressure  RVR may have been precipitated by respiratory distress.       Elevated proBNP  Appears euvolemic  Echocardiogram shows preserved LV function, grade 1 diastolic dysfunction.  Recently received IV fluids due to rhabdomyolysis  IV Lasix will be switched to oral maintenance Lasix.     Rhabdomyolysis  Renal function has normalized.  IV fluids can be stopped  Creatinine is normal     Hyperlipidemia  Start high intensity statin  LDL 80, HDL 34, triglyceride 113 and total cholesterol 135     COPD/pneumonia  Acute hypoxemic hypercapnic respiratory failure.  Currently on antibiotics, bronchodilators and steroids  Supplemental oxygen as needed  Discontinued Mucomyst due to possible reaction  Possible bronchoscopy if respiratory status does not improve     Chest pain  Negative troponin and no ECG changes  Continue to monitor.  Likely musculoskeletal discomfort from effort of breathing        History of pulmonary embolism in 2018  History of IVC filter which was removed in 2019     Obesity  BMI of 33  Lifestyle modifications recommended to the patient        Clinton Fisher MD  04/03/24  07:53 EDT         Discharge Details        Discharge Medications        New Medications        Instructions Start Date   amiodarone 200 MG tablet  Commonly known as: PACERONE   200 mg, Oral, Every 12 Hours Scheduled      budesonide 0.5 MG/2ML nebulizer solution  Commonly known as: PULMICORT   1 mg, Nebulization, 2 Times Daily - RT      calcium carbonate 500 MG chewable tablet  Commonly known as: TUMS   2 tablets, Oral, 3 Times Daily PRN      guaiFENesin 600 MG 12 hr tablet  Commonly known as: MUCINEX   600 mg, Oral, Every 12 Hours Scheduled      ipratropium 0.02 % nebulizer solution  Commonly known as: ATROVENT   0.5 mg, Nebulization, 4 Times Daily - RT      metoprolol succinate XL 25 MG 24 hr  tablet  Commonly known as: TOPROL-XL   25 mg, Oral, Daily   Start Date: April 4, 2024     pantoprazole 40 MG EC tablet  Commonly known as: PROTONIX   40 mg, Oral, Every Early Morning   Start Date: April 4, 2024     roflumilast 250 MCG tablet tablet  Commonly known as: DALIRESP   250 mcg, Oral, Daily   Start Date: April 4, 2024            Changes to Medications        Instructions Start Date   furosemide 40 MG tablet  Commonly known as: LASIX  What changed:   medication strength  how much to take   40 mg, Oral, Daily   Start Date: April 4, 2024            Continue These Medications        Instructions Start Date   albuterol sulfate  (90 Base) MCG/ACT inhaler  Commonly known as: PROVENTIL HFA;VENTOLIN HFA;PROAIR HFA   2 puffs, Inhalation, Every 4 Hours PRN      ALPRAZolam 1 MG tablet  Commonly known as: XANAX   1 mg, Oral, 3 Times Daily PRN      apixaban 5 MG tablet tablet  Commonly known as: ELIQUIS   5 mg, Oral, 2 Times Daily      ipratropium-albuterol 0.5-2.5 mg/3 ml nebulizer  Commonly known as: DUO-NEB   3 mL, Nebulization, Every 4 Hours PRN      traZODone 100 MG tablet  Commonly known as: DESYREL   100 mg, Oral, 3 Times Daily             Stop These Medications      budesonide-formoterol 80-4.5 MCG/ACT inhaler  Commonly known as: SYMBICORT     omeprazole 40 MG capsule  Commonly known as: priLOSEC     predniSONE 5 MG tablet  Commonly known as: DELTASONE              No Known Allergies      Discharge Disposition:   Skilled Nursing Facility (DC - External)    Diet:  Hospital:  Diet Order   Procedures    Diet: Cardiac; Healthy Heart (2-3 Na+); Fluid Consistency: Thin (IDDSI 0)         Discharge Activity:   Activity Instructions       Gradually Increase Activity Until at Pre-Hospitalization Level                CODE STATUS:  Code Status and Medical Interventions:   Ordered at: 03/29/24 1533     Code Status (Patient has no pulse and is not breathing):    CPR (Attempt to Resuscitate)     Medical Interventions  (Patient has pulse or is breathing):    Full Support         No future appointments.        Time spent on Discharge including face to face service:  >30 minutes    Signature: Electronically signed by Royb Stacy MD, 04/03/24, 10:49 EDT.  [facility] Hospitalist Team

## 2024-04-03 NOTE — PLAN OF CARE
Goal Outcome Evaluation:              Outcome Evaluation: no complaints on shift. plans to discharge to [facility] this afternoon.

## 2024-04-03 NOTE — PROGRESS NOTES
Daily Progress Note          Assessment    Acute respiratory distress   Pneumonia   COPD   Asthma   Idiopathic scoliosis   A-fib   Hx PE/DVT   GERD   Anemia   RLS   Panic attacks/Anxiety     Results for orders placed during the hospital encounter of 03/29/24    Adult Transthoracic Echo Complete W/ Cont if Necessary Per Protocol    Interpretation Summary    Left ventricular systolic function is normal. Calculated left ventricular EF = 59% Left ventricular ejection fraction appears to be 56 - 60%.    Left ventricular diastolic function is consistent with (grade I) impaired relaxation.  GLS -15.3%.    Estimated right ventricular systolic pressure from tricuspid regurgitation is normal (<35 mmHg).    No significant valvular abnormalities noted.      Plan:  Oxygen supplement and titration to maintain saturation 90-95%: Currently on 3 L per nasal cannula  Inhaled corticosteroids  IV steroids: Changed to prednisone 20 mg for 5 days  Daliresp  Mucinex  Incentive spirometry  HR/BP control as per cardiology  Diuresis  Anticoagulation: Eliquis             LOS: 5 days     Subjective     Patient reports mild cough shortness of breath    Objective     Vital signs for last 24 hours:  Vitals:    04/03/24 0812 04/03/24 0815 04/03/24 0841 04/03/24 0940   BP:   112/51 116/51   BP Location:    Right arm   Patient Position:    Sitting   Pulse: 80 82 80 74   Resp: 16 16  21   Temp:    97.6 °F (36.4 °C)   TempSrc:    Oral   SpO2: 96% 99%     Weight:       Height:           Intake/Output last 3 shifts:  I/O last 3 completed shifts:  In: 920 [P.O.:920]  Out: 700 [Urine:700]  Intake/Output this shift:  No intake/output data recorded.      Radiology  Imaging Results (Last 24 Hours)       ** No results found for the last 24 hours. **            Labs:  Results from last 7 days   Lab Units 03/31/24  0343   WBC 10*3/mm3 10.05   HEMOGLOBIN g/dL 10.0*   HEMATOCRIT % 33.7*   PLATELETS 10*3/mm3 247     Results from last 7 days   Lab Units  03/31/24  0343   SODIUM mmol/L 143   POTASSIUM mmol/L 4.2   CHLORIDE mmol/L 106   CO2 mmol/L 31.0*   BUN mg/dL 25*   CREATININE mg/dL 0.75*   CALCIUM mg/dL 8.3*   GLUCOSE mg/dL 121*     Results from last 7 days   Lab Units 03/29/24  1606   PH, ARTERIAL pH units 7.371   PO2 ART mm Hg 110.1*   PCO2, ARTERIAL mm Hg 66.4*   HCO3 ART mmol/L 38.5*         Results from last 7 days   Lab Units 04/01/24  1325 04/01/24  1105   HSTROP T ng/L 14 20         Results from last 7 days   Lab Units 03/31/24  0343   MAGNESIUM mg/dL 1.9                   Meds:   SCHEDULE  amiodarone, 200 mg, Oral, Q12H  apixaban, 5 mg, Oral, BID  budesonide, 1 mg, Nebulization, BID - RT  cefTRIAXone, 2,000 mg, Intravenous, Q24H  furosemide, 40 mg, Oral, Daily  guaiFENesin, 600 mg, Oral, Q12H  ipratropium, 0.5 mg, Nebulization, 4x Daily - RT  methylPREDNISolone sodium succinate, 40 mg, Intravenous, Q24H  metoprolol succinate XL, 25 mg, Oral, Daily  pantoprazole, 40 mg, Oral, Q AM  roflumilast, 250 mcg, Oral, Daily  sodium chloride, 10 mL, Intravenous, Q12H      Infusions       PRNs    acetaminophen    ALPRAZolam    calcium carbonate    diphenhydrAMINE    ipratropium-albuterol    ondansetron    sodium chloride    sodium chloride    traZODone    Physical Exam:  General Appearance:  Alert   HEENT:  Normocephalic, without obvious abnormality, Conjunctiva/corneas clear,.   Nares normal, no drainage     Neck:  Supple, symmetrical, trachea midline.   Lungs /Chest wall:   Bilateral basal rhonchi, respirations unlabored, symmetrical wall movement.     Heart: Rate controlled, S1 S2 normal  Abdomen: Soft, non-tender, no masses, no organomegaly.    Extremities: Trace edema, no clubbing or cyanosis     ROS  Constitutional: Negative for chills, fever and malaise/fatigue.   HENT: Negative.    Eyes: Negative.    Cardiovascular: Negative.    Respiratory: Positive for cough and shortness of breath.    Skin: Negative.    Musculoskeletal: Negative.    Gastrointestinal:  Negative.    Genitourinary: Negative.    Neurological: Generalized weakness      I reviewed the recent clinical results  I personally reviewed the latest radiological studies    Part of this note may be an electronic transcription/translation of spoken language to printed text using the Dragon Dictation System.

## 2024-04-03 NOTE — PLAN OF CARE
Goal Outcome Evaluation:      VSS, 3L NC, no chest pain this shift. Patient reported anxiety during this shift, PO xanax and trazadone given. Patient resting comfortably.

## 2024-04-04 LAB
BACTERIA SPEC AEROBE CULT: NORMAL
BACTERIA SPEC AEROBE CULT: NORMAL